# Patient Record
Sex: MALE | Race: WHITE | NOT HISPANIC OR LATINO | URBAN - METROPOLITAN AREA
[De-identification: names, ages, dates, MRNs, and addresses within clinical notes are randomized per-mention and may not be internally consistent; named-entity substitution may affect disease eponyms.]

---

## 2017-03-01 ENCOUNTER — EMERGENCY (EMERGENCY)
Facility: HOSPITAL | Age: 77
LOS: 1 days | End: 2017-03-01
Payer: MEDICARE

## 2017-03-01 PROCEDURE — 99283 EMERGENCY DEPT VISIT LOW MDM: CPT

## 2017-03-12 ENCOUNTER — APPOINTMENT (OUTPATIENT)
Dept: POPULATION HEALTH | Facility: CLINIC | Age: 77
End: 2017-03-12

## 2017-12-04 ENCOUNTER — APPOINTMENT (OUTPATIENT)
Dept: UROLOGY | Facility: CLINIC | Age: 77
End: 2017-12-04

## 2018-01-21 ENCOUNTER — APPOINTMENT (OUTPATIENT)
Dept: POPULATION HEALTH | Facility: CLINIC | Age: 78
End: 2018-01-21
Payer: COMMERCIAL

## 2018-01-21 PROCEDURE — 93000 ELECTROCARDIOGRAM COMPLETE: CPT

## 2018-01-21 PROCEDURE — 99212 OFFICE O/P EST SF 10 MIN: CPT | Mod: 25

## 2018-01-21 PROCEDURE — 92551 PURE TONE HEARING TEST AIR: CPT

## 2018-01-21 PROCEDURE — 36415 COLL VENOUS BLD VENIPUNCTURE: CPT

## 2019-02-10 ENCOUNTER — APPOINTMENT (OUTPATIENT)
Dept: POPULATION HEALTH | Facility: CLINIC | Age: 79
End: 2019-02-10
Payer: COMMERCIAL

## 2019-02-10 PROCEDURE — 36415 COLL VENOUS BLD VENIPUNCTURE: CPT

## 2019-02-10 PROCEDURE — 93000 ELECTROCARDIOGRAM COMPLETE: CPT

## 2019-02-10 PROCEDURE — 99212 OFFICE O/P EST SF 10 MIN: CPT | Mod: 25

## 2019-02-10 PROCEDURE — 94010 BREATHING CAPACITY TEST: CPT

## 2019-02-10 PROCEDURE — 92551 PURE TONE HEARING TEST AIR: CPT

## 2019-05-16 ENCOUNTER — OUTPATIENT (OUTPATIENT)
Dept: OUTPATIENT SERVICES | Facility: HOSPITAL | Age: 79
LOS: 1 days | End: 2019-05-16

## 2019-12-06 ENCOUNTER — APPOINTMENT (OUTPATIENT)
Dept: RADIOLOGY | Facility: CLINIC | Age: 79
End: 2019-12-06
Payer: MEDICARE

## 2019-12-06 PROCEDURE — 71046 X-RAY EXAM CHEST 2 VIEWS: CPT

## 2021-12-28 ENCOUNTER — OUTPATIENT (OUTPATIENT)
Dept: OUTPATIENT SERVICES | Facility: HOSPITAL | Age: 81
LOS: 1 days | End: 2021-12-28

## 2022-01-08 ENCOUNTER — APPOINTMENT (OUTPATIENT)
Dept: DISASTER EMERGENCY | Facility: CLINIC | Age: 82
End: 2022-01-08

## 2022-01-10 ENCOUNTER — FORM ENCOUNTER (OUTPATIENT)
Age: 82
End: 2022-01-10

## 2022-01-11 ENCOUNTER — OUTPATIENT (OUTPATIENT)
Dept: OUTPATIENT SERVICES | Facility: HOSPITAL | Age: 82
LOS: 1 days | End: 2022-01-11

## 2022-01-11 ENCOUNTER — INPATIENT (INPATIENT)
Facility: HOSPITAL | Age: 82
LOS: 2 days | Discharge: HOME HEALTH | End: 2022-01-14

## 2022-01-12 ENCOUNTER — OUTPATIENT (OUTPATIENT)
Dept: OUTPATIENT SERVICES | Facility: HOSPITAL | Age: 82
LOS: 1 days | End: 2022-01-12

## 2022-01-13 ENCOUNTER — OUTPATIENT (OUTPATIENT)
Dept: OUTPATIENT SERVICES | Facility: HOSPITAL | Age: 82
LOS: 1 days | End: 2022-01-13

## 2022-01-14 ENCOUNTER — OUTPATIENT (OUTPATIENT)
Dept: OUTPATIENT SERVICES | Facility: HOSPITAL | Age: 82
LOS: 1 days | End: 2022-01-14

## 2022-01-27 DIAGNOSIS — I25.10 ATHEROSCLEROTIC HEART DISEASE OF NATIVE CORONARY ARTERY WITHOUT ANGINA PECTORIS: ICD-10-CM

## 2022-01-27 DIAGNOSIS — M17.11 UNILATERAL PRIMARY OSTEOARTHRITIS, RIGHT KNEE: ICD-10-CM

## 2022-01-27 DIAGNOSIS — D62 ACUTE POSTHEMORRHAGIC ANEMIA: ICD-10-CM

## 2022-01-27 DIAGNOSIS — I48.0 PAROXYSMAL ATRIAL FIBRILLATION: ICD-10-CM

## 2022-01-27 DIAGNOSIS — Z95.5 PRESENCE OF CORONARY ANGIOPLASTY IMPLANT AND GRAFT: ICD-10-CM

## 2022-01-27 DIAGNOSIS — Z96.651 PRESENCE OF RIGHT ARTIFICIAL KNEE JOINT: ICD-10-CM

## 2022-01-27 DIAGNOSIS — I48.92 UNSPECIFIED ATRIAL FLUTTER: ICD-10-CM

## 2022-01-27 DIAGNOSIS — I10 ESSENTIAL (PRIMARY) HYPERTENSION: ICD-10-CM

## 2022-01-27 DIAGNOSIS — Z79.01 LONG TERM (CURRENT) USE OF ANTICOAGULANTS: ICD-10-CM

## 2022-01-27 DIAGNOSIS — Z95.1 PRESENCE OF AORTOCORONARY BYPASS GRAFT: ICD-10-CM

## 2022-01-27 DIAGNOSIS — Z85.46 PERSONAL HISTORY OF MALIGNANT NEOPLASM OF PROSTATE: ICD-10-CM

## 2022-01-27 DIAGNOSIS — K59.00 CONSTIPATION, UNSPECIFIED: ICD-10-CM

## 2022-01-27 DIAGNOSIS — I25.2 OLD MYOCARDIAL INFARCTION: ICD-10-CM

## 2022-01-27 DIAGNOSIS — J30.9 ALLERGIC RHINITIS, UNSPECIFIED: ICD-10-CM

## 2022-01-27 DIAGNOSIS — E78.5 HYPERLIPIDEMIA, UNSPECIFIED: ICD-10-CM

## 2022-01-31 ENCOUNTER — OUTPATIENT (OUTPATIENT)
Dept: OUTPATIENT SERVICES | Facility: HOSPITAL | Age: 82
LOS: 1 days | End: 2022-01-31

## 2022-01-31 DIAGNOSIS — Z96.651 PRESENCE OF RIGHT ARTIFICIAL KNEE JOINT: ICD-10-CM

## 2022-02-07 DIAGNOSIS — I25.10 ATHEROSCLEROTIC HEART DISEASE OF NATIVE CORONARY ARTERY WITHOUT ANGINA PECTORIS: ICD-10-CM

## 2022-02-07 DIAGNOSIS — Z96.651 PRESENCE OF RIGHT ARTIFICIAL KNEE JOINT: ICD-10-CM

## 2022-02-08 DIAGNOSIS — I48.0 PAROXYSMAL ATRIAL FIBRILLATION: ICD-10-CM

## 2022-02-08 DIAGNOSIS — Z96.651 PRESENCE OF RIGHT ARTIFICIAL KNEE JOINT: ICD-10-CM

## 2022-02-08 DIAGNOSIS — E78.5 HYPERLIPIDEMIA, UNSPECIFIED: ICD-10-CM

## 2022-02-08 DIAGNOSIS — D62 ACUTE POSTHEMORRHAGIC ANEMIA: ICD-10-CM

## 2022-02-08 DIAGNOSIS — I25.10 ATHEROSCLEROTIC HEART DISEASE OF NATIVE CORONARY ARTERY WITHOUT ANGINA PECTORIS: ICD-10-CM

## 2022-02-08 DIAGNOSIS — I10 ESSENTIAL (PRIMARY) HYPERTENSION: ICD-10-CM

## 2022-02-09 DIAGNOSIS — Z96.651 PRESENCE OF RIGHT ARTIFICIAL KNEE JOINT: ICD-10-CM

## 2022-02-09 DIAGNOSIS — I25.10 ATHEROSCLEROTIC HEART DISEASE OF NATIVE CORONARY ARTERY WITHOUT ANGINA PECTORIS: ICD-10-CM

## 2022-02-09 DIAGNOSIS — K59.00 CONSTIPATION, UNSPECIFIED: ICD-10-CM

## 2022-02-09 DIAGNOSIS — I10 ESSENTIAL (PRIMARY) HYPERTENSION: ICD-10-CM

## 2022-02-09 DIAGNOSIS — E78.5 HYPERLIPIDEMIA, UNSPECIFIED: ICD-10-CM

## 2022-02-09 DIAGNOSIS — I48.0 PAROXYSMAL ATRIAL FIBRILLATION: ICD-10-CM

## 2022-02-09 DIAGNOSIS — D62 ACUTE POSTHEMORRHAGIC ANEMIA: ICD-10-CM

## 2022-03-02 ENCOUNTER — APPOINTMENT (OUTPATIENT)
Dept: CT IMAGING | Facility: CLINIC | Age: 82
End: 2022-03-02
Payer: MEDICARE

## 2022-03-02 PROCEDURE — 71260 CT THORAX DX C+: CPT | Mod: MH

## 2022-03-02 PROCEDURE — 82565 ASSAY OF CREATININE: CPT | Mod: QW

## 2022-03-02 PROCEDURE — 74177 CT ABD & PELVIS W/CONTRAST: CPT | Mod: MH

## 2022-04-01 ENCOUNTER — APPOINTMENT (OUTPATIENT)
Dept: ULTRASOUND IMAGING | Facility: CLINIC | Age: 82
End: 2022-04-01
Payer: MEDICARE

## 2022-04-01 PROCEDURE — 76856 US EXAM PELVIC COMPLETE: CPT

## 2022-04-01 PROCEDURE — 76700 US EXAM ABDOM COMPLETE: CPT

## 2022-08-22 ENCOUNTER — NON-APPOINTMENT (OUTPATIENT)
Age: 82
End: 2022-08-22

## 2023-01-13 ENCOUNTER — NON-APPOINTMENT (OUTPATIENT)
Age: 83
End: 2023-01-13

## 2023-01-13 ENCOUNTER — APPOINTMENT (OUTPATIENT)
Dept: OPHTHALMOLOGY | Facility: CLINIC | Age: 83
End: 2023-01-13
Payer: MEDICARE

## 2023-01-13 PROCEDURE — 92134 CPTRZ OPH DX IMG PST SGM RTA: CPT

## 2023-01-13 PROCEDURE — 92004 COMPRE OPH EXAM NEW PT 1/>: CPT

## 2023-01-13 PROCEDURE — 92014 COMPRE OPH EXAM EST PT 1/>: CPT

## 2023-04-13 ENCOUNTER — NON-APPOINTMENT (OUTPATIENT)
Age: 83
End: 2023-04-13

## 2023-09-21 ASSESSMENT — KOOS JR
HOW SEVERE IS YOUR KNEE STIFFNESS AFTER FIRST WAKING IN MORNING: MILD
KOOS JR RAW SCORE: 6
IMPORTED FORM: YES
TWISING OR PIVOTING ON KNEE: MILD
GOING UP OR DOWN STAIRS: MODERATE
IMPORTED KOOS JR SCORE: 6.0
IMPORTED KOOS JR SCORE: 6.0
IMPORTED FORM: YES
KOOS JR RAW SCORE: 6
BENDING TO THE FLOOR TO PICK UP OBJECT: MODERATE
TWISING OR PIVOTING ON KNEE: MILD
HOW SEVERE IS YOUR KNEE STIFFNESS AFTER FIRST WAKING IN MORNING: MILD
BENDING TO THE FLOOR TO PICK UP OBJECT: MODERATE
GOING UP OR DOWN STAIRS: MODERATE

## 2024-01-08 ENCOUNTER — INPATIENT (INPATIENT)
Facility: HOSPITAL | Age: 84
LOS: 1 days | Discharge: HOME | DRG: 551 | End: 2024-01-10
Attending: STUDENT IN AN ORGANIZED HEALTH CARE EDUCATION/TRAINING PROGRAM | Admitting: STUDENT IN AN ORGANIZED HEALTH CARE EDUCATION/TRAINING PROGRAM
Payer: COMMERCIAL

## 2024-01-08 ENCOUNTER — APPOINTMENT (OUTPATIENT)
Dept: RADIOLOGY | Facility: CLINIC | Age: 84
End: 2024-01-08

## 2024-01-08 ENCOUNTER — APPOINTMENT (OUTPATIENT)
Dept: CT IMAGING | Facility: CLINIC | Age: 84
End: 2024-01-08
Payer: MEDICARE

## 2024-01-08 VITALS
HEIGHT: 71 IN | OXYGEN SATURATION: 98 % | HEART RATE: 69 BPM | SYSTOLIC BLOOD PRESSURE: 133 MMHG | DIASTOLIC BLOOD PRESSURE: 70 MMHG | WEIGHT: 162.92 LBS | RESPIRATION RATE: 18 BRPM | TEMPERATURE: 98 F

## 2024-01-08 LAB
ALBUMIN SERPL ELPH-MCNC: 4.2 G/DL — SIGNIFICANT CHANGE UP (ref 3.3–5.2)
ALBUMIN SERPL ELPH-MCNC: 4.2 G/DL — SIGNIFICANT CHANGE UP (ref 3.3–5.2)
ALP SERPL-CCNC: 54 U/L — SIGNIFICANT CHANGE UP (ref 40–120)
ALP SERPL-CCNC: 54 U/L — SIGNIFICANT CHANGE UP (ref 40–120)
ALT FLD-CCNC: 16 U/L — SIGNIFICANT CHANGE UP
ALT FLD-CCNC: 16 U/L — SIGNIFICANT CHANGE UP
ANION GAP SERPL CALC-SCNC: 14 MMOL/L — SIGNIFICANT CHANGE UP (ref 5–17)
ANION GAP SERPL CALC-SCNC: 14 MMOL/L — SIGNIFICANT CHANGE UP (ref 5–17)
APTT BLD: 49 SEC — HIGH (ref 24.5–35.6)
APTT BLD: 49 SEC — HIGH (ref 24.5–35.6)
AST SERPL-CCNC: 26 U/L — SIGNIFICANT CHANGE UP
AST SERPL-CCNC: 26 U/L — SIGNIFICANT CHANGE UP
BASOPHILS # BLD AUTO: 0.03 K/UL — SIGNIFICANT CHANGE UP (ref 0–0.2)
BASOPHILS # BLD AUTO: 0.03 K/UL — SIGNIFICANT CHANGE UP (ref 0–0.2)
BASOPHILS NFR BLD AUTO: 0.3 % — SIGNIFICANT CHANGE UP (ref 0–2)
BASOPHILS NFR BLD AUTO: 0.3 % — SIGNIFICANT CHANGE UP (ref 0–2)
BILIRUB SERPL-MCNC: 1 MG/DL — SIGNIFICANT CHANGE UP (ref 0.4–2)
BILIRUB SERPL-MCNC: 1 MG/DL — SIGNIFICANT CHANGE UP (ref 0.4–2)
BUN SERPL-MCNC: 23.4 MG/DL — HIGH (ref 8–20)
BUN SERPL-MCNC: 23.4 MG/DL — HIGH (ref 8–20)
CALCIUM SERPL-MCNC: 9.6 MG/DL — SIGNIFICANT CHANGE UP (ref 8.4–10.5)
CALCIUM SERPL-MCNC: 9.6 MG/DL — SIGNIFICANT CHANGE UP (ref 8.4–10.5)
CHLORIDE SERPL-SCNC: 101 MMOL/L — SIGNIFICANT CHANGE UP (ref 96–108)
CHLORIDE SERPL-SCNC: 101 MMOL/L — SIGNIFICANT CHANGE UP (ref 96–108)
CO2 SERPL-SCNC: 22 MMOL/L — SIGNIFICANT CHANGE UP (ref 22–29)
CO2 SERPL-SCNC: 22 MMOL/L — SIGNIFICANT CHANGE UP (ref 22–29)
CREAT SERPL-MCNC: 0.63 MG/DL — SIGNIFICANT CHANGE UP (ref 0.5–1.3)
CREAT SERPL-MCNC: 0.63 MG/DL — SIGNIFICANT CHANGE UP (ref 0.5–1.3)
EGFR: 93 ML/MIN/1.73M2 — SIGNIFICANT CHANGE UP
EGFR: 93 ML/MIN/1.73M2 — SIGNIFICANT CHANGE UP
EOSINOPHIL # BLD AUTO: 0.35 K/UL — SIGNIFICANT CHANGE UP (ref 0–0.5)
EOSINOPHIL # BLD AUTO: 0.35 K/UL — SIGNIFICANT CHANGE UP (ref 0–0.5)
EOSINOPHIL NFR BLD AUTO: 3.9 % — SIGNIFICANT CHANGE UP (ref 0–6)
EOSINOPHIL NFR BLD AUTO: 3.9 % — SIGNIFICANT CHANGE UP (ref 0–6)
GLUCOSE SERPL-MCNC: 89 MG/DL — SIGNIFICANT CHANGE UP (ref 70–99)
GLUCOSE SERPL-MCNC: 89 MG/DL — SIGNIFICANT CHANGE UP (ref 70–99)
HCT VFR BLD CALC: 41.1 % — SIGNIFICANT CHANGE UP (ref 39–50)
HCT VFR BLD CALC: 41.1 % — SIGNIFICANT CHANGE UP (ref 39–50)
HGB BLD-MCNC: 13.8 G/DL — SIGNIFICANT CHANGE UP (ref 13–17)
HGB BLD-MCNC: 13.8 G/DL — SIGNIFICANT CHANGE UP (ref 13–17)
IMM GRANULOCYTES NFR BLD AUTO: 0.4 % — SIGNIFICANT CHANGE UP (ref 0–0.9)
IMM GRANULOCYTES NFR BLD AUTO: 0.4 % — SIGNIFICANT CHANGE UP (ref 0–0.9)
INR BLD: 1.53 RATIO — HIGH (ref 0.85–1.18)
INR BLD: 1.53 RATIO — HIGH (ref 0.85–1.18)
LYMPHOCYTES # BLD AUTO: 1.41 K/UL — SIGNIFICANT CHANGE UP (ref 1–3.3)
LYMPHOCYTES # BLD AUTO: 1.41 K/UL — SIGNIFICANT CHANGE UP (ref 1–3.3)
LYMPHOCYTES # BLD AUTO: 15.7 % — SIGNIFICANT CHANGE UP (ref 13–44)
LYMPHOCYTES # BLD AUTO: 15.7 % — SIGNIFICANT CHANGE UP (ref 13–44)
MCHC RBC-ENTMCNC: 32.4 PG — SIGNIFICANT CHANGE UP (ref 27–34)
MCHC RBC-ENTMCNC: 32.4 PG — SIGNIFICANT CHANGE UP (ref 27–34)
MCHC RBC-ENTMCNC: 33.6 GM/DL — SIGNIFICANT CHANGE UP (ref 32–36)
MCHC RBC-ENTMCNC: 33.6 GM/DL — SIGNIFICANT CHANGE UP (ref 32–36)
MCV RBC AUTO: 96.5 FL — SIGNIFICANT CHANGE UP (ref 80–100)
MCV RBC AUTO: 96.5 FL — SIGNIFICANT CHANGE UP (ref 80–100)
MONOCYTES # BLD AUTO: 0.87 K/UL — SIGNIFICANT CHANGE UP (ref 0–0.9)
MONOCYTES # BLD AUTO: 0.87 K/UL — SIGNIFICANT CHANGE UP (ref 0–0.9)
MONOCYTES NFR BLD AUTO: 9.7 % — SIGNIFICANT CHANGE UP (ref 2–14)
MONOCYTES NFR BLD AUTO: 9.7 % — SIGNIFICANT CHANGE UP (ref 2–14)
NEUTROPHILS # BLD AUTO: 6.3 K/UL — SIGNIFICANT CHANGE UP (ref 1.8–7.4)
NEUTROPHILS # BLD AUTO: 6.3 K/UL — SIGNIFICANT CHANGE UP (ref 1.8–7.4)
NEUTROPHILS NFR BLD AUTO: 70 % — SIGNIFICANT CHANGE UP (ref 43–77)
NEUTROPHILS NFR BLD AUTO: 70 % — SIGNIFICANT CHANGE UP (ref 43–77)
PLATELET # BLD AUTO: 177 K/UL — SIGNIFICANT CHANGE UP (ref 150–400)
PLATELET # BLD AUTO: 177 K/UL — SIGNIFICANT CHANGE UP (ref 150–400)
POTASSIUM SERPL-MCNC: 4.5 MMOL/L — SIGNIFICANT CHANGE UP (ref 3.5–5.3)
POTASSIUM SERPL-MCNC: 4.5 MMOL/L — SIGNIFICANT CHANGE UP (ref 3.5–5.3)
POTASSIUM SERPL-SCNC: 4.5 MMOL/L — SIGNIFICANT CHANGE UP (ref 3.5–5.3)
POTASSIUM SERPL-SCNC: 4.5 MMOL/L — SIGNIFICANT CHANGE UP (ref 3.5–5.3)
PROT SERPL-MCNC: 6.8 G/DL — SIGNIFICANT CHANGE UP (ref 6.6–8.7)
PROT SERPL-MCNC: 6.8 G/DL — SIGNIFICANT CHANGE UP (ref 6.6–8.7)
PROTHROM AB SERPL-ACNC: 16.8 SEC — HIGH (ref 9.5–13)
PROTHROM AB SERPL-ACNC: 16.8 SEC — HIGH (ref 9.5–13)
RBC # BLD: 4.26 M/UL — SIGNIFICANT CHANGE UP (ref 4.2–5.8)
RBC # BLD: 4.26 M/UL — SIGNIFICANT CHANGE UP (ref 4.2–5.8)
RBC # FLD: 13.4 % — SIGNIFICANT CHANGE UP (ref 10.3–14.5)
RBC # FLD: 13.4 % — SIGNIFICANT CHANGE UP (ref 10.3–14.5)
SODIUM SERPL-SCNC: 137 MMOL/L — SIGNIFICANT CHANGE UP (ref 135–145)
SODIUM SERPL-SCNC: 137 MMOL/L — SIGNIFICANT CHANGE UP (ref 135–145)
WBC # BLD: 9 K/UL — SIGNIFICANT CHANGE UP (ref 3.8–10.5)
WBC # BLD: 9 K/UL — SIGNIFICANT CHANGE UP (ref 3.8–10.5)
WBC # FLD AUTO: 9 K/UL — SIGNIFICANT CHANGE UP (ref 3.8–10.5)
WBC # FLD AUTO: 9 K/UL — SIGNIFICANT CHANGE UP (ref 3.8–10.5)

## 2024-01-08 PROCEDURE — 99285 EMERGENCY DEPT VISIT HI MDM: CPT

## 2024-01-08 PROCEDURE — 72128 CT CHEST SPINE W/O DYE: CPT | Mod: 26,MG

## 2024-01-08 PROCEDURE — 72131 CT LUMBAR SPINE W/O DYE: CPT | Mod: 26,ME

## 2024-01-08 PROCEDURE — G1004: CPT

## 2024-01-08 PROCEDURE — 71046 X-RAY EXAM CHEST 2 VIEWS: CPT

## 2024-01-08 PROCEDURE — 70450 CT HEAD/BRAIN W/O DYE: CPT | Mod: 26,MF

## 2024-01-08 PROCEDURE — 70450 CT HEAD/BRAIN W/O DYE: CPT | Mod: MH,77

## 2024-01-08 NOTE — ED PROVIDER NOTE - CARE PLAN
1 Principal Discharge DX:	Subarachnoid hemorrhage   Principal Discharge DX:	Subarachnoid hemorrhage  Secondary Diagnosis:	Fracture of lumbar vertebra, compression

## 2024-01-08 NOTE — ED PROVIDER NOTE - CLINICAL SUMMARY MEDICAL DECISION MAKING FREE TEXT BOX
pt is well appearing and neuro intact with small SAH and fall from several days ago. NSx consulted for recommendations pt is well appearing and neuro intact with small SAH and fall from several days ago. NSx consulted for recommendations    Spoke with neurosurgery PA advises hold Eliquis no need for repeat CAT scan will request brace for L3 fracture.  spoke personally with family patient unsteady on feet for multiple days unsafe dispo home ambulating with distress at home multiple falls with acute fall subarachnoid hemorrhage acute fall L3 fracture consulted trauma awaiting their recommendations updated daughter and patient's wife at bedside awaiting trauma formal plan

## 2024-01-08 NOTE — CONSULT NOTE ADULT - SUBJECTIVE AND OBJECTIVE BOX
Patient is a 83y old  Male who presents with a chief complaint of   HPI: 84 y/o M with pmhx of HTN, HLD, afib on Eliquis, cardiac stents (2018), prostate surgery, right knee replacement, hernia surgery, transferred from [facility] [place] for SAH s/p fall. Pt wife at bedside, noting that he has had "dizzy spells" and falls for the last 2-3 months causing him to fall. States that last Thursday, pt hit his head on the dresser with + HS/-LOC after feeling dizzy from getting up. Pt denies HA, SOB, CP, or abd pain at the time, but states he hurt his left lower back during the fall, which continues to intermittently hurt him now. Notes they called the cardiologist whom they were able to see today. Cardiologist ordered an outpatient CTH which demonstrated L frontal SAH and was sent to [facility] [place], eliquis was not reversed, findings confirmed, and then sent here. Of note, wife states patient shuffles his feet ever since his knee replacement. Denies current neurological deficits but does not left lower back pain.       PAST MEDICAL & SURGICAL HISTORY:    FAMILY HISTORY:      SOCIAL HISTORY:  Tobacco Use:  EtOH use:   Substance:    Allergies    No Known Allergies    Intolerances        REVIEW OF SYSTEMS  Negative except as noted in HPI      HOME MEDICATIONS:  Home Medications:      MEDICATIONS:  Antibiotics:    Neuro:    Anticoagulation:    OTHER:    IVF:      Vital Signs Last 24 Hrs  T(C): 36.7 (08 Jan 2024 17:52), Max: 36.7 (08 Jan 2024 17:52)  T(F): 98 (08 Jan 2024 17:52), Max: 98 (08 Jan 2024 17:52)  HR: 69 (08 Jan 2024 17:52) (69 - 69)  BP: 133/70 (08 Jan 2024 17:52) (133/70 - 133/70)  BP(mean): --  RR: 18 (08 Jan 2024 17:52) (18 - 18)  SpO2: 98% (08 Jan 2024 17:52) (98% - 98%)    Parameters below as of 08 Jan 2024 17:52  Patient On (Oxygen Delivery Method): room air          PHYSICAL EXAM:  GENERAL: NAD, well-groomed  HEAD:  Atraumatic, normocephalic  EYES: Conjunctiva and sclera clear; corneal reflex intact   PRADEEP COMA SCORE: E- V- M- =15  CRANIAL NERVES: PERRL. EOMI without nystagmus. Facial sensation intact V1-3 distribution b/l. Face symmetric w/ normal eye closure and smile, tongue midline. Hearing grossly intact. Speech clear. Head turning and shoulder shrug intact.   MOTOR: strength 5/5 b/l upper and lower extremities  SENSATION: grossly intact to light touch all extremities  CHEST/LUNG: Chest rise and fall equally and bilaterally   HEART: +S1/+S2   ABDOMEN: Soft, nontender, nondistended   EXTREMITIES:   no clubbing, cyanosis, or edema  SKIN: Warm, dry; no rashes or lesions    LABS:        CULTURES:      RADIOLOGY & ADDITIONAL STUDIES:    ASSESSMENT AND PLAN:     Assessment: 84 y/o male on ASA/Eliquis for afib/cardiac stents s/p +HS/-LOC to dresser after falling from getting up. CTH demonstrates L frontal SAH.     Plan:   - Trauma eval   - 6 hour CTH stability scan   - CT T/L spine for back pain   - BP <160   - hold AC/AP   - Syncope and dizziness work up   - scans for back    Patient is a 83y old  Male who presents with a chief complaint of   HPI: 82 y/o M with pmhx of HTN, HLD, afib on Eliquis, cardiac stents (2018), prostate surgery, right knee replacement, hernia surgery, transferred from [facility] [place] for SAH s/p fall. Pt wife at bedside, noting that he has had "dizzy spells" and falls for the last 2-3 months causing him to fall. States that last Thursday, pt hit his head on the dresser with + HS/-LOC after feeling dizzy from getting up. Pt denies HA, SOB, CP, or abd pain at the time, but states he hurt his left lower back during the fall, which continues to intermittently hurt him now. Notes they called the cardiologist whom they were able to see today. Cardiologist ordered an outpatient CTH which demonstrated L frontal SAH and was sent to [facility] [place], eliquis was not reversed, findings confirmed, and then sent here. Of note, wife states patient shuffles his feet ever since his knee replacement. Denies current neurological deficits but does not left lower back pain.       PAST MEDICAL & SURGICAL HISTORY:    FAMILY HISTORY:      SOCIAL HISTORY:  Tobacco Use:  EtOH use:   Substance:    Allergies    No Known Allergies    Intolerances        REVIEW OF SYSTEMS  Negative except as noted in HPI      HOME MEDICATIONS:  Home Medications:      MEDICATIONS:  Antibiotics:    Neuro:    Anticoagulation:    OTHER:    IVF:      Vital Signs Last 24 Hrs  T(C): 36.7 (08 Jan 2024 17:52), Max: 36.7 (08 Jan 2024 17:52)  T(F): 98 (08 Jan 2024 17:52), Max: 98 (08 Jan 2024 17:52)  HR: 69 (08 Jan 2024 17:52) (69 - 69)  BP: 133/70 (08 Jan 2024 17:52) (133/70 - 133/70)  BP(mean): --  RR: 18 (08 Jan 2024 17:52) (18 - 18)  SpO2: 98% (08 Jan 2024 17:52) (98% - 98%)    Parameters below as of 08 Jan 2024 17:52  Patient On (Oxygen Delivery Method): room air          PHYSICAL EXAM:  GENERAL: NAD, well-groomed  HEAD:  Atraumatic, normocephalic  EYES: Conjunctiva and sclera clear; corneal reflex intact   PRADEEP COMA SCORE: E- V- M- =15  CRANIAL NERVES: PERRL. EOMI without nystagmus. Facial sensation intact V1-3 distribution b/l. Face symmetric w/ normal eye closure and smile, tongue midline. Hearing grossly intact. Speech clear. Head turning and shoulder shrug intact.   MOTOR: strength 5/5 b/l upper and lower extremities  SENSATION: grossly intact to light touch all extremities  CHEST/LUNG: Chest rise and fall equally and bilaterally   HEART: +S1/+S2   ABDOMEN: Soft, nontender, nondistended   EXTREMITIES:   no clubbing, cyanosis, or edema  SKIN: Warm, dry; no rashes or lesions    LABS:        CULTURES:      RADIOLOGY & ADDITIONAL STUDIES:    ASSESSMENT AND PLAN:     Assessment: 82 y/o male on ASA/Eliquis for afib/cardiac stents s/p +HS/-LOC to dresser after falling from getting up. CTH demonstrates L frontal SAH.     Plan:   - Trauma eval   - 6 hour CTH stability scan   - CT T/L spine for back pain   - BP <160   - hold AC/AP   - Syncope and dizziness work up   - scans for back    Patient is a 83y old  Male who presents with a chief complaint of   HPI: 82 y/o M with pmhx of HTN, HLD, afib on Eliquis, cardiac stents (2018), prostate surgery, right knee replacement, hernia surgery, transferred from [facility] [place] for SAH s/p fall. Pt wife at bedside, noting that he has had "dizzy spells" and falls for the last 2-3 months. States that last Thursday, pt hit his head on the dresser with + HS/-LOC after feeling dizzy from getting up. Pt denies HA, SOB, CP, or abd pain at the time, but states he hurt his left lower back during the fall, which continues to intermittently hurt him now. Notes they called the cardiologist whom they were able to see today. Cardiologist ordered an outpatient CTH which demonstrated L frontal SAH and was sent to [facility] [place], eliquis was not reversed, findings confirmed, and then sent here. Of note, wife states patient shuffles his feet ever since his knee replacement. Denies current neurological deficits but does not left lower back pain.       PAST MEDICAL & SURGICAL HISTORY:    FAMILY HISTORY:      SOCIAL HISTORY:  Tobacco Use:  EtOH use:   Substance:    Allergies    No Known Allergies    Intolerances        REVIEW OF SYSTEMS  Negative except as noted in HPI      HOME MEDICATIONS:  Home Medications:      MEDICATIONS:  Antibiotics:    Neuro:    Anticoagulation:    OTHER:    IVF:      Vital Signs Last 24 Hrs  T(C): 36.7 (08 Jan 2024 17:52), Max: 36.7 (08 Jan 2024 17:52)  T(F): 98 (08 Jan 2024 17:52), Max: 98 (08 Jan 2024 17:52)  HR: 69 (08 Jan 2024 17:52) (69 - 69)  BP: 133/70 (08 Jan 2024 17:52) (133/70 - 133/70)  BP(mean): --  RR: 18 (08 Jan 2024 17:52) (18 - 18)  SpO2: 98% (08 Jan 2024 17:52) (98% - 98%)    Parameters below as of 08 Jan 2024 17:52  Patient On (Oxygen Delivery Method): room air          PHYSICAL EXAM:  GENERAL: NAD, well-groomed  HEAD:  Atraumatic, normocephalic  EYES: Conjunctiva and sclera clear; corneal reflex intact   PRADEEP COMA SCORE: E- V- M- =15  CRANIAL NERVES: PERRL. EOMI without nystagmus. Facial sensation intact V1-3 distribution b/l. Face symmetric w/ normal eye closure and smile, tongue midline. Hearing grossly intact. Speech clear. Head turning and shoulder shrug intact.   MOTOR: strength 5/5 b/l upper and lower extremities  SENSATION: grossly intact to light touch all extremities  CHEST/LUNG: Chest rise and fall equally and bilaterally   HEART: +S1/+S2   ABDOMEN: Soft, nontender, nondistended   EXTREMITIES:   no clubbing, cyanosis, or edema  SKIN: Warm, dry; no rashes or lesions    LABS:        CULTURES:      RADIOLOGY & ADDITIONAL STUDIES:    ASSESSMENT AND PLAN:     Assessment: 82 y/o male on ASA/Eliquis for afib/cardiac stents s/p +HS/-LOC to dresser after falling from getting up. CTH demonstrates L frontal SAH.     Plan:   - Trauma eval   - 6 hour CTH stability scan   - CT T/L spine for back pain   - BP <160   - hold AC/AP   - Syncope and dizziness work up   - scans for back    Patient is a 83y old  Male who presents with a chief complaint of   HPI: 82 y/o M with pmhx of HTN, HLD, afib on Eliquis, cardiac stents (2018), prostate surgery, right knee replacement, hernia surgery, transferred from [place] for SAH s/p fall. Pt wife at bedside, noting that he has had "dizzy spells" and falls for the last 2-3 months. States that last Thursday, pt hit his head on the dresser with + HS/-LOC after feeling dizzy from getting up. Pt denies HA, SOB, CP, or abd pain at the time, but states he hurt his left lower back during the fall, which continues to intermittently hurt him now. Notes they called the cardiologist whom they were able to see today. Cardiologist ordered an outpatient CTH which demonstrated L frontal SAH and was sent to [place], eliquis was not reversed, findings confirmed, and then sent here. Of note, wife states patient shuffles his feet ever since his knee replacement. Denies current neurological deficits but does not left lower back pain.       PAST MEDICAL & SURGICAL HISTORY:    FAMILY HISTORY:      SOCIAL HISTORY:  Tobacco Use:  EtOH use:   Substance:    Allergies    No Known Allergies    Intolerances        REVIEW OF SYSTEMS  Negative except as noted in HPI      HOME MEDICATIONS:  Home Medications:      MEDICATIONS:  Antibiotics:    Neuro:    Anticoagulation:    OTHER:    IVF:      Vital Signs Last 24 Hrs  T(C): 36.7 (08 Jan 2024 17:52), Max: 36.7 (08 Jan 2024 17:52)  T(F): 98 (08 Jan 2024 17:52), Max: 98 (08 Jan 2024 17:52)  HR: 69 (08 Jan 2024 17:52) (69 - 69)  BP: 133/70 (08 Jan 2024 17:52) (133/70 - 133/70)  BP(mean): --  RR: 18 (08 Jan 2024 17:52) (18 - 18)  SpO2: 98% (08 Jan 2024 17:52) (98% - 98%)    Parameters below as of 08 Jan 2024 17:52  Patient On (Oxygen Delivery Method): room air          PHYSICAL EXAM:  GENERAL: NAD, well-groomed  HEAD:  Atraumatic, normocephalic  EYES: Conjunctiva and sclera clear; corneal reflex intact   PRADEEP COMA SCORE: E- V- M- =15  CRANIAL NERVES: PERRL. EOMI without nystagmus. Facial sensation intact V1-3 distribution b/l. Face symmetric w/ normal eye closure and smile, tongue midline. Hearing grossly intact. Speech clear. Head turning and shoulder shrug intact.   MOTOR: strength 5/5 b/l upper and lower extremities  SENSATION: grossly intact to light touch all extremities  CHEST/LUNG: Chest rise and fall equally and bilaterally   HEART: +S1/+S2   ABDOMEN: Soft, nontender, nondistended   EXTREMITIES:   no clubbing, cyanosis, or edema  SKIN: Warm, dry; no rashes or lesions    LABS:        CULTURES:      RADIOLOGY & ADDITIONAL STUDIES:    ASSESSMENT AND PLAN:     Assessment: 82 y/o male on ASA/Eliquis for afib/cardiac stents s/p +HS/-LOC to dresser after falling from getting up. CTH demonstrates L frontal SAH.     Plan:   - Trauma eval   - 6 hour CTH stability scan   - CT T/L spine for back pain   - BP <160   - hold AC/AP   - Syncope and dizziness work up   - scans for back

## 2024-01-08 NOTE — ED ADULT TRIAGE NOTE - CHIEF COMPLAINT QUOTE
Speaking Coherently transfer [place] [facility] [place] for [place] on eliquis positive SAH alert and oriented x4, no complaints. transfer from [facility] [place] for [place] on eliquis positive SAH alert and oriented x4, no complaints.

## 2024-01-08 NOTE — ED ADULT NURSE NOTE - SUICIDE SCREENING QUESTION 3
c/o congestion since weekend clartin given today x4 nosebleeds denies fever feeding and drinking well No

## 2024-01-08 NOTE — ED ADULT NURSE REASSESSMENT NOTE - NS ED NURSE REASSESS COMMENT FT1
plan of care assumed from Niesha ENGEL. no S&S of acute distress noted. [place] [facility] [place]. pt states he has been experiencing "dizzy spells" and lightheadedness for a few days. denies headahce, vision changes. cp, sob, tinging/numbness. plan of care reviewed with pt and family; awaiting CT scan. plan of care assumed from Niesha ENGEL. no S&S of acute distress noted. pt is a transfer from [facility] [place] s/p fall x 4 days ago. pt states he has been experiencing "dizzy spells" and lightheadedness for a few days. denies headahce, vision changes. cp, sob, tinging/numbness. plan of care reviewed with pt and family; awaiting CT scan.

## 2024-01-08 NOTE — ED ADULT NURSE REASSESSMENT NOTE - NS ED NURSE REASSESS COMMENT FT1
pt received CT and results came back. awaiting neurosx reassessment. plan of care ongoing. pt and family updated

## 2024-01-08 NOTE — ED ADULT NURSE NOTE - OBJECTIVE STATEMENT
Pt transferred from [facility] for eval by neuro surgery s/p a trip and fall on 1/4, hit back of head, takes Eliquis qd, (last dose this am) head ct SAH, received k centra prior to transfer. Pt A+Ox3, gross neuro intact, gcs 15, Pt transferred from [facility] [place] by neuro surgery s/p a trip and fall on 1/4, hit back of head, takes Eliquis qd, (last dose this am) head ct SAH, received k centra prior to transfer. Pt A+Ox3, gross neuro intact, gcs 15,

## 2024-01-08 NOTE — ED PROVIDER NOTE - OBJECTIVE STATEMENT
83 year old male with PMH afib on eliquis presents as a [place] [facility] [place]. Pt reports that he had a trip and fall on 1/4, hit head, no LOC. He reports a mild headache since then, and was sent for outpt CTH this am, which revealed a small SAH. He went to [facility] and was subsequently transferred here. he denies blurry vision, numbness, tingling, weakness. 83 year old male with PMH afib on eliquis presents as a transfer from [facility] [place] for SAH. Pt reports that he had a trip and fall on 1/4, hit head, no LOC. He reports a mild headache since then, and was sent for outpt CTH this am, which revealed a small SAH. He went to [facility] and was subsequently transferred here. he denies blurry vision, numbness, tingling, weakness.

## 2024-01-08 NOTE — ED ADULT NURSE NOTE - NSFALLHARMRISKINTERV_ED_ALL_ED
Assistance OOB with selected safe patient handling equipment if applicable/Assistance with ambulation/Communicate risk of Fall with Harm to all staff, patient, and family/Monitor gait and stability/Monitor for mental status changes and reorient to person, place, and time, as needed/Move patient closer to nursing station/within visual sight of ED staff/Provide visual cue: red socks, yellow wristband, yellow gown, etc/Reinforce activity limits and safety measures with patient and family/Toileting schedule using arm’s reach rule for commode and bathroom/Use of alarms - bed, stretcher, chair and/or video monitoring/Bed in lowest position, wheels locked, appropriate side rails in place/Call bell, personal items and telephone in reach/Instruct patient to call for assistance before getting out of bed/chair/stretcher/Non-slip footwear applied when patient is off stretcher/[place] to call system/Physically safe environment - no spills, clutter or unnecessary equipment/Purposeful Proactive Rounding/Room/bathroom lighting operational, light cord in reach Assistance OOB with selected safe patient handling equipment if applicable/Assistance with ambulation/Communicate risk of Fall with Harm to all staff, patient, and family/Monitor gait and stability/Monitor for mental status changes and reorient to person, place, and time, as needed/Move patient closer to nursing station/within visual sight of ED staff/Provide visual cue: red socks, yellow wristband, yellow gown, etc/Reinforce activity limits and safety measures with patient and family/Toileting schedule using arm’s reach rule for commode and bathroom/Use of alarms - bed, stretcher, chair and/or video monitoring/Bed in lowest position, wheels locked, appropriate side rails in place/Call bell, personal items and telephone in reach/Instruct patient to call for assistance before getting out of bed/chair/stretcher/Non-slip footwear applied when patient is off stretcher/[place] to call system/Physically safe environment - no spills, clutter or unnecessary equipment/Purposeful Proactive Rounding/Room/bathroom lighting operational, light cord in reach Assistance OOB with selected safe patient handling equipment if applicable/Assistance with ambulation/Communicate risk of Fall with Harm to all staff, patient, and family/Monitor gait and stability/Monitor for mental status changes and reorient to person, place, and time, as needed/Move patient closer to nursing station/within visual sight of ED staff/Provide patient with walking aids/Provide visual cue: red socks, yellow wristband, yellow gown, etc/Reinforce activity limits and safety measures with patient and family/Toileting schedule using arm’s reach rule for commode and bathroom/Use of alarms - bed, stretcher, chair and/or video monitoring/Bed in lowest position, wheels locked, appropriate side rails in place/Call bell, personal items and telephone in reach/Instruct patient to call for assistance before getting out of bed/chair/stretcher/Non-slip footwear applied when patient is off stretcher/[place] to call system/Physically safe environment - no spills, clutter or unnecessary equipment/Purposeful Proactive Rounding/Room/bathroom lighting operational, light cord in reach Assistance OOB with selected safe patient handling equipment if applicable/Assistance with ambulation/Communicate risk of Fall with Harm to all staff, patient, and family/Monitor gait and stability/Monitor for mental status changes and reorient to person, place, and time, as needed/Move patient closer to nursing station/within visual sight of ED staff/Provide patient with walking aids/Provide visual cue: red socks, yellow wristband, yellow gown, etc/Reinforce activity limits and safety measures with patient and family/Toileting schedule using arm’s reach rule for commode and bathroom/Use of alarms - bed, stretcher, chair and/or video monitoring/Bed in lowest position, wheels locked, appropriate side rails in place/Call bell, personal items and telephone in reach/Instruct patient to call for assistance before getting out of bed/chair/stretcher/Non-slip footwear applied when patient is off stretcher/[place] to call system/Physically safe environment - no spills, clutter or unnecessary equipment/Purposeful Proactive Rounding/Room/bathroom lighting operational, light cord in reach

## 2024-01-08 NOTE — ED ADULT NURSE REASSESSMENT NOTE - NSFALLHARMRISKINTERV_ED_ALL_ED
Assistance OOB with selected safe patient handling equipment if applicable/Assistance with ambulation/Communicate risk of Fall with Harm to all staff, patient, and family/Encourage patient to sit up slowly, dangle for a short time, stand at bedside before walking/Monitor gait and stability/Orthostatic vital signs/Provide patient with walking aids/Provide visual cue: red socks, yellow wristband, yellow gown, etc/Reinforce activity limits and safety measures with patient and family/Bed in lowest position, wheels locked, appropriate side rails in place/Call bell, personal items and telephone in reach/Instruct patient to call for assistance before getting out of bed/chair/stretcher/Non-slip footwear applied when patient is off stretcher/[place] to call system/Physically safe environment - no spills, clutter or unnecessary equipment/Purposeful Proactive Rounding/Room/bathroom lighting operational, light cord in reach Assistance OOB with selected safe patient handling equipment if applicable/Assistance with ambulation/Communicate risk of Fall with Harm to all staff, patient, and family/Encourage patient to sit up slowly, dangle for a short time, stand at bedside before walking/Monitor gait and stability/Orthostatic vital signs/Provide patient with walking aids/Provide visual cue: red socks, yellow wristband, yellow gown, etc/Reinforce activity limits and safety measures with patient and family/Bed in lowest position, wheels locked, appropriate side rails in place/Call bell, personal items and telephone in reach/Instruct patient to call for assistance before getting out of bed/chair/stretcher/Non-slip footwear applied when patient is off stretcher/[place] to call system/Physically safe environment - no spills, clutter or unnecessary equipment/Purposeful Proactive Rounding/Room/bathroom lighting operational, light cord in reach

## 2024-01-08 NOTE — ED PROVIDER NOTE - PROGRESS NOTE DETAILS
Spoke with neurosurgery PA advises hold Eliquis no need for repeat CAT scan will request brace for L3 fracture.  spoke personally with family patient unsteady on feet for multiple days unsafe dispo home ambulating with distress at home multiple falls with acute fall subarachnoid hemorrhage acute fall L3 fracture consulted trauma awaiting their recommendations updated daughter and patient's wife at bedside awaiting trauma formal plan

## 2024-01-08 NOTE — ED ADULT NURSE NOTE - CHIEF COMPLAINT QUOTE
transfer from [facility] [place] for fall on eliquis positive SAH alert and oriented x4, no complaints. transfer from [facility] for fall on eliquis positive SAH alert and oriented x4, no complaints.

## 2024-01-09 DIAGNOSIS — I60.9 NONTRAUMATIC SUBARACHNOID HEMORRHAGE, UNSPECIFIED: ICD-10-CM

## 2024-01-09 PROCEDURE — 70450 CT HEAD/BRAIN W/O DYE: CPT | Mod: 26

## 2024-01-09 PROCEDURE — 93306 TTE W/DOPPLER COMPLETE: CPT | Mod: 26

## 2024-01-09 PROCEDURE — 93880 EXTRACRANIAL BILAT STUDY: CPT | Mod: 26

## 2024-01-09 PROCEDURE — 93010 ELECTROCARDIOGRAM REPORT: CPT

## 2024-01-09 PROCEDURE — 99232 SBSQ HOSP IP/OBS MODERATE 35: CPT

## 2024-01-09 PROCEDURE — 99223 1ST HOSP IP/OBS HIGH 75: CPT

## 2024-01-09 PROCEDURE — 99221 1ST HOSP IP/OBS SF/LOW 40: CPT

## 2024-01-09 RX ORDER — LISINOPRIL 2.5 MG/1
40 TABLET ORAL DAILY
Refills: 0 | Status: DISCONTINUED | OUTPATIENT
Start: 2024-01-09 | End: 2024-01-10

## 2024-01-09 RX ORDER — METHOCARBAMOL 500 MG/1
750 TABLET, FILM COATED ORAL THREE TIMES A DAY
Refills: 0 | Status: DISCONTINUED | OUTPATIENT
Start: 2024-01-09 | End: 2024-01-10

## 2024-01-09 RX ORDER — OXYCODONE HYDROCHLORIDE 5 MG/1
2.5 TABLET ORAL EVERY 4 HOURS
Refills: 0 | Status: DISCONTINUED | OUTPATIENT
Start: 2024-01-09 | End: 2024-01-10

## 2024-01-09 RX ORDER — ACETAMINOPHEN 500 MG
975 TABLET ORAL EVERY 6 HOURS
Refills: 0 | Status: DISCONTINUED | OUTPATIENT
Start: 2024-01-09 | End: 2024-01-10

## 2024-01-09 RX ORDER — SENNA PLUS 8.6 MG/1
2 TABLET ORAL AT BEDTIME
Refills: 0 | Status: DISCONTINUED | OUTPATIENT
Start: 2024-01-09 | End: 2024-01-10

## 2024-01-09 RX ORDER — ATORVASTATIN CALCIUM 80 MG/1
40 TABLET, FILM COATED ORAL AT BEDTIME
Refills: 0 | Status: DISCONTINUED | OUTPATIENT
Start: 2024-01-09 | End: 2024-01-10

## 2024-01-09 RX ORDER — POLYETHYLENE GLYCOL 3350 17 G/17G
17 POWDER, FOR SOLUTION ORAL DAILY
Refills: 0 | Status: DISCONTINUED | OUTPATIENT
Start: 2024-01-09 | End: 2024-01-10

## 2024-01-09 RX ORDER — LIDOCAINE 4 G/100G
2 CREAM TOPICAL EVERY 24 HOURS
Refills: 0 | Status: DISCONTINUED | OUTPATIENT
Start: 2024-01-09 | End: 2024-01-10

## 2024-01-09 RX ORDER — ESCITALOPRAM OXALATE 10 MG/1
10 TABLET, FILM COATED ORAL DAILY
Refills: 0 | Status: DISCONTINUED | OUTPATIENT
Start: 2024-01-09 | End: 2024-01-10

## 2024-01-09 RX ORDER — OXYCODONE HYDROCHLORIDE 5 MG/1
5 TABLET ORAL EVERY 4 HOURS
Refills: 0 | Status: DISCONTINUED | OUTPATIENT
Start: 2024-01-09 | End: 2024-01-10

## 2024-01-09 RX ORDER — MAGNESIUM HYDROXIDE 400 MG/1
30 TABLET, CHEWABLE ORAL DAILY
Refills: 0 | Status: DISCONTINUED | OUTPATIENT
Start: 2024-01-09 | End: 2024-01-10

## 2024-01-09 RX ORDER — HYDROMORPHONE HYDROCHLORIDE 2 MG/ML
0.2 INJECTION INTRAMUSCULAR; INTRAVENOUS; SUBCUTANEOUS EVERY 4 HOURS
Refills: 0 | Status: DISCONTINUED | OUTPATIENT
Start: 2024-01-09 | End: 2024-01-10

## 2024-01-09 RX ADMIN — LISINOPRIL 40 MILLIGRAM(S): 2.5 TABLET ORAL at 05:58

## 2024-01-09 RX ADMIN — ATORVASTATIN CALCIUM 40 MILLIGRAM(S): 80 TABLET, FILM COATED ORAL at 23:44

## 2024-01-09 RX ADMIN — Medication 975 MILLIGRAM(S): at 12:35

## 2024-01-09 RX ADMIN — Medication 975 MILLIGRAM(S): at 23:45

## 2024-01-09 RX ADMIN — METHOCARBAMOL 750 MILLIGRAM(S): 500 TABLET, FILM COATED ORAL at 14:29

## 2024-01-09 RX ADMIN — METHOCARBAMOL 750 MILLIGRAM(S): 500 TABLET, FILM COATED ORAL at 23:44

## 2024-01-09 RX ADMIN — Medication 975 MILLIGRAM(S): at 05:59

## 2024-01-09 RX ADMIN — Medication 975 MILLIGRAM(S): at 06:05

## 2024-01-09 RX ADMIN — SENNA PLUS 2 TABLET(S): 8.6 TABLET ORAL at 23:45

## 2024-01-09 RX ADMIN — ESCITALOPRAM OXALATE 10 MILLIGRAM(S): 10 TABLET, FILM COATED ORAL at 12:35

## 2024-01-09 RX ADMIN — Medication 975 MILLIGRAM(S): at 17:28

## 2024-01-09 RX ADMIN — METHOCARBAMOL 750 MILLIGRAM(S): 500 TABLET, FILM COATED ORAL at 05:59

## 2024-01-09 RX ADMIN — LIDOCAINE 2 PATCH: 4 CREAM TOPICAL at 05:59

## 2024-01-09 NOTE — PHYSICAL THERAPY INITIAL EVALUATION ADULT - PERTINENT HX OF CURRENT PROBLEM, REHAB EVAL
84yo M presenting s/p fall with SAH and L3 compression fx 82yo M presenting s/p fall with SAH and L3 compression fx

## 2024-01-09 NOTE — PROGRESS NOTE ADULT - SUBJECTIVE AND OBJECTIVE BOX
HPI:  84 y/o M with pmhx of HTN, HLD, afib on Eliquis, cardiac stents (2018), prostate surgery, right knee replacement, hernia surgery, transferred from [facility] [place] for SAH s/p fall. Pt wife at bedside, noting that he has had "dizzy spells" and falls for the last 2-3 months. States that last Thursday, pt hit his head on the dresser with + HS/-LOC after feeling dizzy from getting up. Pt denies HA, SOB, CP, or abd pain at the time, but states he hurt his left lower back during the fall, which continues to intermittently hurt him now. Notes they called the cardiologist whom they were able to see today. Cardiologist ordered an outpatient CTH which demonstrated L frontal SAH and was sent to [facility] [place], eliquis was not reversed, findings confirmed, and then sent here. Of note, wife states patient shuffles his feet ever since his knee replacement. Denies current neurological deficits but does not left lower back pain.     INTERVAL HPI/OVERNIGHT EVENTS:  83-year-old male seen lying comfortably in bed this morning on rounds. Denies headache, weakness, numbness, n/v/d, fevers, chills, chest pain, SOB.     Vital Signs Last 24 Hrs  T(C): 37.2 (09 Jan 2024 15:53), Max: 37.2 (09 Jan 2024 15:53)  T(F): 98.9 (09 Jan 2024 15:53), Max: 98.9 (09 Jan 2024 15:53)  HR: 58 (09 Jan 2024 15:53) (53 - 69)  BP: 116/67 (09 Jan 2024 15:53) (113/66 - 134/94)  RR: 17 (09 Jan 2024 15:53) (16 - 20)  SpO2: 96% (09 Jan 2024 15:53) (96% - 100%)  Parameters below as of 09 Jan 2024 15:53  Patient On (Oxygen Delivery Method): room air    PHYSICAL EXAM:  GENERAL: NAD, well-groomed  HEAD:  Atraumatic, normocephalic  EYES: Conjunctiva and sclera clear; corneal reflex intact   PRADEEP COMA SCORE: E- V- M- =15  CRANIAL NERVES: PERRL. EOMI without nystagmus. Facial sensation intact V1-3 distribution b/l. Face symmetric w/ normal eye closure and smile, tongue midline. Hearing grossly intact. Speech clear. Head turning and shoulder shrug intact.   MOTOR: Strength 5/5 b/l upper and lower extremities  SENSATION: Grossly intact to light touch all extremities  CHEST/LUNG: Chest rise and fall equally and bilaterally   ABDOMEN: Soft, nontender, nondistended   EXTREMITIES: No clubbing, cyanosis, or edema  SKIN: Warm, dry; no rashes or lesions    LABS:                        13.8   9.00  )-----------( 177      ( 08 Jan 2024 19:40 )             41.1     01-08    137  |  101  |  23.4<H>  ----------------------------<  89  4.5   |  22.0  |  0.63    Ca    9.6      08 Jan 2024 19:40    TPro  6.8  /  Alb  4.2  /  TBili  1.0  /  DBili  x   /  AST  26  /  ALT  16  /  AlkPhos  54  01-08    PT/INR - ( 08 Jan 2024 19:40 )   PT: 16.8 sec;   INR: 1.53 ratio    PTT - ( 08 Jan 2024 19:40 )  PTT:49.0 sec    Urinalysis Basic - ( 08 Jan 2024 19:40 )  Color: x / Appearance: x / SG: x / pH: x  Gluc: 89 mg/dL / Ketone: x  / Bili: x / Urobili: x   Blood: x / Protein: x / Nitrite: x   Leuk Esterase: x / RBC: x / WBC x   Sq Epi: x / Non Sq Epi: x / Bacteria: x    RADIOLOGY & ADDITIONAL TESTS:      CT Lumbar Spine No Cont (01.08.24 @ 20:46)  IMPRESSION:  Moderate compression fracture of L3 vertebral body. Correlate for point tenderness.  MRI lumbar spine would be helpful for further assessment.    CT Head No Cont (01.08.24 @ 20:37)   Parenchymal volume loss and chronic microvascular ischemic changes identified  There is evidence of high attenuation seen involving the high left frontal region which is compatible with an area of acute subarachnoid hemorrhage. This corresponds to patient's history.  No significant shift or herniation is seen.  Evaluation of the osseous appropriate window appears unremarkable  Minimal right maxillary sinus mucosal thickening is seen.  Both mastoid and middle ear regions appear clear.  IMPRESSION: Acute subarachnoid hemorrhage as described above.         HPI:  82 y/o M with pmhx of HTN, HLD, afib on Eliquis, cardiac stents (2018), prostate surgery, right knee replacement, hernia surgery[place] [facility] [place]. Pt wife at bedside, noting that he has had "dizzy spells" and falls for the last 2-3 months. States that last Thursday, pt hit his head on the dresser with + HS/-LOC after feeling dizzy from getting up. Pt denies HA, SOB, CP, or abd pain at the time, but states he hurt his left lower back during the fall, which continues to intermittently hurt him now. Notes they called the cardiologist whom they were able to see today. Cardiologist ordered an outpatient CTH which demonstrated L frontal SAH [place] [facility] [place] [facility] [place], findings confirmed, and then sent here. Of note, wife states patient shuffles his feet ever since his knee replacement. Denies current neurological deficits but does not left lower back pain.     INTERVAL HPI/OVERNIGHT EVENTS:  83-year-old male seen lying comfortably in bed this morning on rounds. Denies headache, weakness, numbness, n/v/d, fevers, chills, chest pain, SOB.     Vital Signs Last 24 Hrs  T(C): 37.2 (09 Jan 2024 15:53), Max: 37.2 (09 Jan 2024 15:53)  T(F): 98.9 (09 Jan 2024 15:53), Max: 98.9 (09 Jan 2024 15:53)  HR: 58 (09 Jan 2024 15:53) (53 - 69)  BP: 116/67 (09 Jan 2024 15:53) (113/66 - 134/94)  RR: 17 (09 Jan 2024 15:53) (16 - 20)  SpO2: 96% (09 Jan 2024 15:53) (96% - 100%)  Parameters below as of 09 Jan 2024 15:53  Patient On (Oxygen Delivery Method): room air    PHYSICAL EXAM:  GENERAL: NAD, well-groomed  HEAD:  Atraumatic, normocephalic  EYES: Conjunctiva and sclera clear; corneal reflex intact   PRADEEP COMA SCORE: E- V- M- =15  CRANIAL NERVES: PERRL. EOMI without nystagmus. Facial sensation intact V1-3 distribution b/l. Face symmetric w/ normal eye closure and smile, tongue midline. Hearing grossly intact. Speech clear. Head turning and shoulder shrug intact.   MOTOR: Strength 5/5 b/l upper and lower extremities  SENSATION: Grossly intact to light touch all extremities  CHEST/LUNG: Chest rise and fall equally and bilaterally   ABDOMEN: Soft, nontender, nondistended   EXTREMITIES: No clubbing, cyanosis, or edema  SKIN: Warm, dry; no rashes or lesions    LABS:                        13.8   9.00  )-----------( 177      ( 08 Jan 2024 19:40 )             41.1     01-08    137  |  101  |  23.4<H>  ----------------------------<  89  4.5   |  22.0  |  0.63    Ca    9.6      08 Jan 2024 19:40    TPro  6.8  /  Alb  4.2  /  TBili  1.0  /  DBili  x   /  AST  26  /  ALT  16  /  AlkPhos  54  01-08    PT/INR - ( 08 Jan 2024 19:40 )   PT: 16.8 sec;   INR: 1.53 ratio    PTT - ( 08 Jan 2024 19:40 )  PTT:49.0 sec    Urinalysis Basic - ( 08 Jan 2024 19:40 )  Color: x / Appearance: x / SG: x / pH: x  Gluc: 89 mg/dL / Ketone: x  / Bili: x / Urobili: x   Blood: x / Protein: x / Nitrite: x   Leuk Esterase: x / RBC: x / WBC x   Sq Epi: x / Non Sq Epi: x / Bacteria: x    RADIOLOGY & ADDITIONAL TESTS:      CT Lumbar Spine No Cont (01.08.24 @ 20:46)  IMPRESSION:  Moderate compression fracture of L3 vertebral body. Correlate for point tenderness.  MRI lumbar spine would be helpful for further assessment.    CT Head No Cont (01.08.24 @ 20:37)   Parenchymal volume loss and chronic microvascular ischemic changes identified  There is evidence of high attenuation seen involving the high left frontal region which is compatible with an area of acute subarachnoid hemorrhage. This corresponds to patient's history.  No significant shift or herniation is seen.  Evaluation of the osseous appropriate window appears unremarkable  Minimal right maxillary sinus mucosal thickening is seen.  Both mastoid and middle ear regions appear clear.  IMPRESSION: Acute subarachnoid hemorrhage as described above.

## 2024-01-09 NOTE — H&P ADULT - ASSESSMENT
ASSESSMENT: Patient is a 83y old male with     PLAN:    - Admit to Trauma floor   - f/u neurosx recs   - Orthostatics BP   - Syncope and dizziness work up   - Brace for L3 fx   - PT eval   - regular diet   - pain control  - DVT ppx  - strict I/Os  - Plan discussed with Attending, Dr. Vega      ASSESSMENT: Patient is a 83y old male with small SAH    PLAN:    - Admit to Trauma floor   - f/u neurosx recs   - Orthostatics BP   - Syncope and dizziness work up   - Brace for L3 fx   - PT eval   - regular diet   - pain control  - DVT ppx  - strict I/Os  - Plan discussed with Attending, Dr. Vega

## 2024-01-09 NOTE — PROGRESS NOTE ADULT - ASSESSMENT
ASSESSMENT: 84yo M presenting s/p fall with SAH and L3 compression fx. Repeat CTH stable.    PLAN:  - LSO brace during prolonged sitting  - holding AC  - f/u syncope w/u  - f/u geriatric consult  - will need tertiary survey   - f/u PT

## 2024-01-09 NOTE — PHYSICAL THERAPY INITIAL EVALUATION ADULT - PHYSICAL ASSIST/NONPHYSICAL ASSIST: STAND/SIT, REHAB EVAL
Telephone Encounter by Meseret Watts LPN at 02/23/18 12:20 PM     Author:  Meseret Watts LPN Service:  (none) Author Type:  Licensed Nurse     Filed:  02/23/18 12:42 PM Encounter Date:  2/23/2018 Status:  Signed     :  Mesreet Watts LPN (Licensed Nurse)             left for Pepe notifying him that I spoke to Rosa at the [place] [facility]. She arranges transportation for the Seniors registered with  when Jill is not available. She stated she would call either him or me to notify if they found someone to drive him.  will also give Pepe information on [employer] when/if he calls me back.    Justyna Barbosa [facility][RR1.1M]      Revision History        User Key Date/Time User Provider Type Action    > RR1.1 02/23/18 12:42 PM Meseret Watts LPN Licensed Nurse Sign    M - Manual            
1 person assist

## 2024-01-09 NOTE — PATIENT PROFILE ADULT - FALL HARM RISK - HARM RISK INTERVENTIONS
Assistance with ambulation/Assistance OOB with selected safe patient handling equipment/Communicate Risk of Fall with Harm to all staff/Discuss with provider need for PT consult/Monitor gait and stability/Provide patient with walking aids - walker, cane, crutches/Reinforce activity limits and safety measures with patient and family/Sit up slowly, dangle for a short time, stand at bedside before walking/Tailored Fall Risk Interventions/Visual Cue: Yellow wristband and red socks/Bed in lowest position, wheels locked, appropriate side rails in place/Call bell, personal items and telephone in reach/Instruct patient to call for assistance before getting out of bed or chair/Non-slip footwear when patient is out of bed/[place] to call system/Physically safe environment - no spills, clutter or unnecessary equipment/Purposeful Proactive Rounding/Room/bathroom lighting operational, light cord in reach Assistance with ambulation/Assistance OOB with selected safe patient handling equipment/Communicate Risk of Fall with Harm to all staff/Discuss with provider need for PT consult/Monitor gait and stability/Provide patient with walking aids - walker, cane, crutches/Reinforce activity limits and safety measures with patient and family/Sit up slowly, dangle for a short time, stand at bedside before walking/Tailored Fall Risk Interventions/Visual Cue: Yellow wristband and red socks/Bed in lowest position, wheels locked, appropriate side rails in place/Call bell, personal items and telephone in reach/Instruct patient to call for assistance before getting out of bed or chair/Non-slip footwear when patient is out of bed/[place] to call system/Physically safe environment - no spills, clutter or unnecessary equipment/Purposeful Proactive Rounding/Room/bathroom lighting operational, light cord in reach

## 2024-01-09 NOTE — PROGRESS NOTE ADULT - SUBJECTIVE AND OBJECTIVE BOX
Jose was awake and alert in bed as I measured and sized an Aspen LSO for him to use OOB. I fit him with the brace as he rolled side to side in bed, and instructed him on donning and adjustment of the brace. Brace was then removed, labeled and placed on the side of his bed for later use. Printed instructions and contact info were provided.    [facility]  [phone]    Jose was awake and alert in bed as I measured and sized an Aspen LSO for him to use OOB. I fit him with the brace as he rolled side to side in bed, and instructed him on donning and adjustment of the brace. Brace was then removed, labeled and placed on the side of his bed for later use. Printed instructions and contact info were provided.    [facility]  [phone]

## 2024-01-09 NOTE — CHART NOTE - NSCHARTNOTEFT_GEN_A_CORE
Tertiary Trauma Survey (TTS)    Date of TTS: 01-09-24 @ 15:37                             Admit Date: 01-09-24 @ 00:49      Trauma Activation: consult[place] [facility] [place]    List Injuries Identified to Date: L3 vertebral body fracture, SAH        List Operative and Interventional Radiological Procedures:         Physical Exam:    Neuro: cranial nerves intact, no focal motor/sensory deficits     HEENT: No palpable facial/skull deformities, no ttp    Pulm/Chest: unlabored breathing, symmetric chest rise, no chest wall ttp, crepitus or deformities     Cardiac: RRR    GI / Abdomen: soft, nt and nd. No rebound or guarding     Musculoskeletal / Extremities: Normal ROM without pain in all extremities aside from chronic left shoulder pain. No c/t spine ttp. Lumbar ttp.    Integumentary: No significant skin tears       RADIOLOGICAL FINDINGS REVIEW:    < from: US Duplex Carotid Arteries Complete, Bilateral (01.09.24 @ 08:59) >    IMPRESSION: No significant hemodynamic stenosis of either carotid artery.    Measurement of carotid stenosis is based on velocity parameters that   correlate the residual internal carotid diameter with that of the more   distal vessel in accordance with a method such as the North American   Symptomatic Carotid Endarterectomy Trial (NASCET).        < from: CT Thoracic Spine No Cont (01.08.24 @ 20:46) >    IMPRESSION:    Moderate compression fracture of L3 vertebral body. Correlate for point   tenderness.    MRI lumbar spine would be helpful for further assessment.      < from: CT Lumbar Spine No Cont (01.08.24 @ 20:46) >    IMPRESSION:    Moderate compression fracture of L3 vertebral body. Correlate for point   tenderness.    MRI lumbar spine would be helpful for further assessment.      < from: CT Head No Cont (01.08.24 @ 20:37) >    IMPRESSION: Acute subarachnoid hemorrhage       INCIDENTAL FINDINGS:    [x ] No    [ ] Yes, Findings are:        [ x] Tertiary exam complete, there are no new injuries identified    [ ] Tertiary exam done, new injuries identified are:                [ ] Imaging ordered: Tertiary Trauma Survey (TTS)    Date of TTS: 01-09-24 @ 15:37                             Admit Date: 01-09-24 @ 00:49      Trauma Activation: consult[place] [facility] [place]    List Injuries Identified to Date: L3 vertebral body fracture, SAH        List Operative and Interventional Radiological Procedures:         Physical Exam:    Neuro: cranial nerves intact, no focal motor/sensory deficits     HEENT: No palpable facial/skull deformities, no ttp    Pulm/Chest: unlabored breathing, symmetric chest rise, no chest wall ttp, crepitus or deformities     Cardiac: RRR    GI / Abdomen: soft, nt and nd. No rebound or guarding     Musculoskeletal / Extremities: Normal ROM without pain in all extremities aside from chronic left shoulder pain. No c/t spine ttp. Lumbar ttp.    Integumentary: No significant skin tears       RADIOLOGICAL FINDINGS REVIEW:    < from: US Duplex Carotid Arteries Complete, Bilateral (01.09.24 @ 08:59) >    IMPRESSION: No significant hemodynamic stenosis of either carotid artery.    Measurement of carotid stenosis is based on velocity parameters that   correlate the residual internal carotid diameter with that of the more   distal vessel in accordance with a method such as the North American   Symptomatic Carotid Endarterectomy Trial (NASCET).        < from: CT Thoracic Spine No Cont (01.08.24 @ 20:46) >    IMPRESSION:    Moderate compression fracture of L3 vertebral body. Correlate for point   tenderness.    MRI lumbar spine would be helpful for further assessment.      < from: CT Lumbar Spine No Cont (01.08.24 @ 20:46) >    IMPRESSION:    Moderate compression fracture of L3 vertebral body. Correlate for point   tenderness.    MRI lumbar spine would be helpful for further assessment.      < from: CT Head No Cont (01.08.24 @ 20:37) >    IMPRESSION: Acute subarachnoid hemorrhage       INCIDENTAL FINDINGS:    [x ] No    [ ] Yes, Findings are:        [ x] Tertiary exam complete, there are no new injuries identified    [ ] Tertiary exam done, new injuries identified are:                [ ] Imaging ordered:

## 2024-01-09 NOTE — PROGRESS NOTE ADULT - SUBJECTIVE AND OBJECTIVE BOX
Very pleased with care given and all staff were very attentive, kind, and very professional and floor nurses and staff were great also.  Home health and PT working with patient at home.  Daughter staying with her 24/7.  Home health monitoring low blood pressure and encouraging fluids and increased  nutrition. Daughter states they understand all discharge instructions.   SUBJECTIVE: Pt resting comfortably. Endorsing only mild back pain. Hemodynamically stable.     Vitals:  T(C): 36.8 (01-09-24 @ 08:15), Max: 37 (01-08-24 @ 23:30)  T(F): 98.2 (01-09-24 @ 08:15), Max: 98.6 (01-08-24 @ 23:30)  HR: 58 (01-09-24 @ 11:15) (53 - 69)  BP: 117/66 (01-09-24 @ 11:15) (113/66 - 134/94)  ABP: --  ABP(mean): --  RR: 16 (01-09-24 @ 11:15) (16 - 20)  SpO2: 99% (01-09-24 @ 11:15) (97% - 100%)    LABS:  cret                        13.8   9.00  )-----------( 177      ( 08 Jan 2024 19:40 )             41.1     01-08    137  |  101  |  23.4<H>  ----------------------------<  89  4.5   |  22.0  |  0.63    Ca    9.6      08 Jan 2024 19:40    TPro  6.8  /  Alb  4.2  /  TBili  1.0  /  DBili  x   /  AST  26  /  ALT  16  /  AlkPhos  54  01-08    PT/INR - ( 08 Jan 2024 19:40 )   PT: 16.8 sec;   INR: 1.53 ratio         PTT - ( 08 Jan 2024 19:40 )  PTT:49.0 sec    PHYSICAL EXAM:   General: NAD, Lying in bed comfortably  Neuro: A+Ox3  HEENT: EOMI, PERRLA, MMM  Cardio: RRR  Resp: Non labored breathing on RA  GI/Abd: Soft, NT/ND, no rebound/guarding, no masses palpated  Vascular: All 4 extremities warm and well perfused.   Pelvis: stable  Musculoskeletal: All 4 extremities moving spontaneously, no limitations, no spinal tenderness.

## 2024-01-09 NOTE — CONSULT NOTE ADULT - SUBJECTIVE AND OBJECTIVE BOX
Patient is a 83y old  Male who presents with a chief complaint of SAH , L3 compression fx after mechanical fall .   Geriatric consult requested .   Patient seen and examined in ED , comfortable , AAX3 , denies sob/ chest pain , , c/o mild low back pain     CC: low back pain , s/p fall 5 days ago  , SAH , L 3 compression fx       HPI:      from H&P : 82 y/o M with pmhx of HTN, HLD, afib on Eliquis, shuffling gait , walks with walker , recurrent falls , CAD , s/p CABG( 2016)  , cardiac stents x3 , last 2008 , prostate cancer , s/p proctectomy  about 10 yrs ago ,  right knee replacement ( 2022) ,  hernia surgery, transferred from [facility] [place] for SAH s/p fall. Pt wife at bedside, noting that he has had "dizzy spells" and falls for the last 2-3 months. States that last Thursday, pt hit his head on the dresser with + HS/-LOC after feeling dizzy from getting up. Pt denies HA, SOB, CP, or abd pain at the time, but states he hurt his left lower back during the fall, which continues to intermittently hurt him now. Notes they called the cardiologist whom they were able to see today. Cardiologist ordered an outpatient CTH which demonstrated L frontal SAH and was sent to [facility] [place], eliquis was not reversed, findings confirmed, and then sent here. Of note, wife states patient shuffles his feet ever since his knee replacement.           PAST MEDICAL & SURGICAL HISTORY:    As above     Family Hx \  Prostate cancer - brother   MI father - at 78 yrs old      Social History:  Tobacco - denies   ETOH - occasionally   Illicit drug abuse - denies          Allergies    No Known Allergies    Intolerances        REVIEW OF SYSTEMS:    Low back pain , shuffling gait , all other systems are reviewed and are negative .       MEDICATIONS  (STANDING):  acetaminophen     Tablet .. 975 milliGRAM(s) Oral every 6 hours  atorvastatin 40 milliGRAM(s) Oral at bedtime  escitalopram 10 milliGRAM(s) Oral daily  lidocaine   4% Patch 2 Patch Transdermal every 24 hours  lisinopril 40 milliGRAM(s) Oral daily  methocarbamol 750 milliGRAM(s) Oral three times a day  senna 2 Tablet(s) Oral at bedtime    MEDICATIONS  (PRN):  HYDROmorphone  Injectable 0.2 milliGRAM(s) IV Push every 4 hours PRN breakthrough  oxyCODONE    IR 5 milliGRAM(s) Oral every 4 hours PRN Severe Pain (7 - 10)  oxyCODONE    IR 2.5 milliGRAM(s) Oral every 4 hours PRN Moderate Pain (4 - 6)      Vital Signs Last 24 Hrs  T(C): 37.2 (09 Jan 2024 15:53), Max: 37.2 (09 Jan 2024 15:53)  T(F): 98.9 (09 Jan 2024 15:53), Max: 98.9 (09 Jan 2024 15:53)  HR: 58 (09 Jan 2024 15:53) (53 - 69)  BP: 116/67 (09 Jan 2024 15:53) (113/66 - 134/94)  BP(mean): --  RR: 17 (09 Jan 2024 15:53) (16 - 20)  SpO2: 96% (09 Jan 2024 15:53) (96% - 100%)    Parameters below as of 09 Jan 2024 15:53  Patient On (Oxygen Delivery Method): room air        PHYSICAL EXAM:    GENERAL: NAD, well-groomed, well-developed  HEAD:  Atraumatic, Normocephalic  EYES: EOMI, PERRLA, conjunctiva and sclera clear  NECK: Supple, No JVD, Normal thyroid  NERVOUS SYSTEM:  Alert & Oriented X3 , NO FOCAL DEFICIT   CHEST/LUNG: CTA  b/l,  no rales, rhonchi, wheezing, or rubs  HEART: Regular rate and rhythm; No murmurs, rubs, or gallops  ABDOMEN: Soft, Nontender, Nondistended; Bowel sounds present  EXTREMITIES:  2+ Peripheral Pulses, No clubbing, cyanosis, or edema ,   LYMPH: No lymphadenopathy noted  SKIN: No rashes or lesions    LABS:                        13.8   9.00  )-----------( 177      ( 08 Jan 2024 19:40 )             41.1     01-08    137  |  101  |  23.4<H>  ----------------------------<  89  4.5   |  22.0  |  0.63    Ca    9.6      08 Jan 2024 19:40    TPro  6.8  /  Alb  4.2  /  TBili  1.0  /  DBili  x   /  AST  26  /  ALT  16  /  AlkPhos  54  01-08    PT/INR - ( 08 Jan 2024 19:40 )   PT: 16.8 sec;   INR: 1.53 ratio         PTT - ( 08 Jan 2024 19:40 )  PTT:49.0 sec        RADIOLOGY & ADDITIONAL STUDIES:    < from: CT Head No Cont (01.08.24 @ 20:37) >    ACC: [id number] EXAM:  CT BRAIN   ORDERED BY: KOFFI PADRON     PROCEDURE DATE:  01/08/2024        < end of copied text >  < from: CT Head No Cont (01.08.24 @ 20:37) >  IMPRESSION: Acute subarachnoid hemorrhage as described above.    --- End of Report ---      < end of copied text >    < from: CT Lumbar Spine No Cont (01.08.24 @ 20:46) >    ACC: [id number] EXAM:  CT LUMBAR SPINE   ORDERED BY: KOFFI PADRON     ACC: [id number] EXAM:  CT THORACIC SPINE   ORDERED BY: KOFFI PADRON     PROCEDURE DATE:  01/08/2024      < end of copied text >  < from: CT Lumbar Spine No Cont (01.08.24 @ 20:46) >    IMPRESSION:    Moderate compression fracture of L3 vertebral body. Correlate for point   tenderness.    MRI lumbar spine would be helpful for further assessment.    --- End of Report ---      < end of copied text >  < from: US Duplex Carotid Arteries Complete, Bilateral (01.09.24 @ 08:59) >    ACC: [id number] EXAM:  US DPLX CAROTIDS COMPL BI   ORDERED BY: SUMAN DAVIES     PROCEDURE DATE:  01/09/2024      < end of copied text >  < from: US Duplex Carotid Arteries Complete, Bilateral (01.09.24 @ 08:59) >    IMPRESSION: No significant hemodynamic stenosis of either carotid artery.    Measurement of carotid stenosis is based on velocity parameters that   correlate the residual internal carotid diameter with that of the more   distal vessel in accordance with a method such as the North American   Symptomatic Carotid Endarterectomy Trial (NASCET).    --- End of Report ---        < end of copied text >  < from: 12 Lead ECG (01.09.24 @ 09:47) >  Ventricular Rate 57 BPM    Atrial Rate 57 BPM    QRS Duration 134 ms    Q-T Interval 510 ms    QTC Calculation(Bazett) 496 ms    R Axis -39 degrees    T Axis 17 degrees    Diagnosis Line *** Poor data quality, interpretation may be adversely affected  at fib/junct rhythm  Left axis deviation  Right bundle branch block  Abnormal ECG    < end of copied text >    < from: TTE W or WO Ultrasound Enhancing Agent (01.09.24 @ 13:00) >    TRANSTHORACIC ECHOCARDIOGRAM REPORT  ________________________________________________________________________________                                      _______       Pt. Name:       IVAN MACKENZIE Study Date:    1/9/2024  MRN:            [id number]        YOB: 1940  Accession #:    [id number]       Age:           83 years  Account#:       [id number]      Gender:        M  Heart Rate:                     Height:        70.00 in (177.80 cm)  Rhythm:                         Weight:        162.00 lb (73.48 kg)  Blood Pressure: 113/66 mmHg     BSA/BMI:       1.91 m² / 23.24 kg/m²    < end of copied text >  < from: TTE W or WO Ultrasound Enhancing Agent (01.09.24 @ 13:00) >  CONCLUSIONS:      1. Technically difficult image quality.   2. Left ventricular systolic function is normal with an ejection fraction visually estimated at 60 to 65 %.   3. The right ventricle is not well visualized. probably normal systolic function.   4. The left atrium is mildly dilated.   5. No significant valvular disease.   6. No prior echocardiogram is available for comparison.    __________________________________________________________________________________    < end of copied text >               Patient is a 83y old  Male who presents with a chief complaint of SAH , L3 compression fx after mechanical fall .   Geriatric consult requested .   Patient seen and examined in ED , comfortable , AAX3 , denies sob/ chest pain , , c/o mild low back pain     CC: low back pain , s/p fall 5 days ago  , SAH , L 3 compression fx       HPI:      from H&P : 84 y/o M with pmhx of HTN, HLD, afib on Eliquis, shuffling gait , walks with walker , recurrent falls , CAD , s/p CABG( 2016)  , cardiac stents x3 , last 2008 , prostate cancer , s/p proctectomy  about 10 yrs ago ,  right knee replacement ( 2022) ,  hernia surgery, transferred from [facility] [place] for SAH s/p fall. Pt wife at bedside, noting that he has had "dizzy spells" and falls for the last 2-3 months. States that last Thursday, pt hit his head on the dresser with + HS/-LOC after feeling dizzy from getting up. Pt denies HA, SOB, CP, or abd pain at the time, but states he hurt his left lower back during the fall, which continues to intermittently hurt him now. Notes they called the cardiologist whom they were able to see today. Cardiologist ordered an outpatient CTH which demonstrated L frontal SAH and was sent to [facility], eliquis was not reversed, findings confirmed, and then sent here. Of note, wife states patient shuffles his feet ever since his knee replacement.           PAST MEDICAL & SURGICAL HISTORY:    As above     Family Hx \  Prostate cancer - brother   MI father - at 78 yrs old      Social History:  Tobacco - denies   ETOH - occasionally   Illicit drug abuse - denies          Allergies    No Known Allergies    Intolerances        REVIEW OF SYSTEMS:    Low back pain , shuffling gait , all other systems are reviewed and are negative .       MEDICATIONS  (STANDING):  acetaminophen     Tablet .. 975 milliGRAM(s) Oral every 6 hours  atorvastatin 40 milliGRAM(s) Oral at bedtime  escitalopram 10 milliGRAM(s) Oral daily  lidocaine   4% Patch 2 Patch Transdermal every 24 hours  lisinopril 40 milliGRAM(s) Oral daily  methocarbamol 750 milliGRAM(s) Oral three times a day  senna 2 Tablet(s) Oral at bedtime    MEDICATIONS  (PRN):  HYDROmorphone  Injectable 0.2 milliGRAM(s) IV Push every 4 hours PRN breakthrough  oxyCODONE    IR 5 milliGRAM(s) Oral every 4 hours PRN Severe Pain (7 - 10)  oxyCODONE    IR 2.5 milliGRAM(s) Oral every 4 hours PRN Moderate Pain (4 - 6)      Vital Signs Last 24 Hrs  T(C): 37.2 (09 Jan 2024 15:53), Max: 37.2 (09 Jan 2024 15:53)  T(F): 98.9 (09 Jan 2024 15:53), Max: 98.9 (09 Jan 2024 15:53)  HR: 58 (09 Jan 2024 15:53) (53 - 69)  BP: 116/67 (09 Jan 2024 15:53) (113/66 - 134/94)  BP(mean): --  RR: 17 (09 Jan 2024 15:53) (16 - 20)  SpO2: 96% (09 Jan 2024 15:53) (96% - 100%)    Parameters below as of 09 Jan 2024 15:53  Patient On (Oxygen Delivery Method): room air        PHYSICAL EXAM:    GENERAL: NAD, well-groomed, well-developed  HEAD:  Atraumatic, Normocephalic  EYES: EOMI, PERRLA, conjunctiva and sclera clear  NECK: Supple, No JVD, Normal thyroid  NERVOUS SYSTEM:  Alert & Oriented X3 , NO FOCAL DEFICIT   CHEST/LUNG: CTA  b/l,  no rales, rhonchi, wheezing, or rubs  HEART: Regular rate and rhythm; No murmurs, rubs, or gallops  ABDOMEN: Soft, Nontender, Nondistended; Bowel sounds present  EXTREMITIES:  2+ Peripheral Pulses, No clubbing, cyanosis, or edema ,   LYMPH: No lymphadenopathy noted  SKIN: No rashes or lesions    LABS:                        13.8   9.00  )-----------( 177      ( 08 Jan 2024 19:40 )             41.1     01-08    137  |  101  |  23.4<H>  ----------------------------<  89  4.5   |  22.0  |  0.63    Ca    9.6      08 Jan 2024 19:40    TPro  6.8  /  Alb  4.2  /  TBili  1.0  /  DBili  x   /  AST  26  /  ALT  16  /  AlkPhos  54  01-08    PT/INR - ( 08 Jan 2024 19:40 )   PT: 16.8 sec;   INR: 1.53 ratio         PTT - ( 08 Jan 2024 19:40 )  PTT:49.0 sec        RADIOLOGY & ADDITIONAL STUDIES:    < from: CT Head No Cont (01.08.24 @ 20:37) >    ACC: [id number] EXAM:  CT BRAIN   ORDERED BY: KOFFI PADRON     PROCEDURE DATE:  01/08/2024        < end of copied text >  < from: CT Head No Cont (01.08.24 @ 20:37) >  IMPRESSION: Acute subarachnoid hemorrhage as described above.    --- End of Report ---      < end of copied text >    < from: CT Lumbar Spine No Cont (01.08.24 @ 20:46) >    ACC: [id number] EXAM:  CT LUMBAR SPINE   ORDERED BY: KOFFI PADRON     ACC: [id number] EXAM:  CT THORACIC SPINE   ORDERED BY: KOFFI PADRON     PROCEDURE DATE:  01/08/2024      < end of copied text >  < from: CT Lumbar Spine No Cont (01.08.24 @ 20:46) >    IMPRESSION:    Moderate compression fracture of L3 vertebral body. Correlate for point   tenderness.    MRI lumbar spine would be helpful for further assessment.    --- End of Report ---      < end of copied text >  < from: US Duplex Carotid Arteries Complete, Bilateral (01.09.24 @ 08:59) >    ACC: [id number] EXAM:  US DPLX CAROTIDS COMPL BI   ORDERED BY: SUMAN DAVIES     PROCEDURE DATE:  01/09/2024      < end of copied text >  < from: US Duplex Carotid Arteries Complete, Bilateral (01.09.24 @ 08:59) >    IMPRESSION: No significant hemodynamic stenosis of either carotid artery.    Measurement of carotid stenosis is based on velocity parameters that   correlate the residual internal carotid diameter with that of the more   distal vessel in accordance with a method such as the North American   Symptomatic Carotid Endarterectomy Trial (NASCET).    --- End of Report ---        < end of copied text >  < from: 12 Lead ECG (01.09.24 @ 09:47) >  Ventricular Rate 57 BPM    Atrial Rate 57 BPM    QRS Duration 134 ms    Q-T Interval 510 ms    QTC Calculation(Bazett) 496 ms    R Axis -39 degrees    T Axis 17 degrees    Diagnosis Line *** Poor data quality, interpretation may be adversely affected  at fib/junct rhythm  Left axis deviation  Right bundle branch block  Abnormal ECG    < end of copied text >    < from: TTE W or WO Ultrasound Enhancing Agent (01.09.24 @ 13:00) >    TRANSTHORACIC ECHOCARDIOGRAM REPORT  ________________________________________________________________________________                                      _______       Pt. Name:       IVAN MACKENZIE Study Date:    1/9/2024  MRN:            [id number]        YOB: 1940  Accession #:    [id number]       Age:           83 years  Account#:       [id number]      Gender:        M  Heart Rate:                     Height:        70.00 in (177.80 cm)  Rhythm:                         Weight:        162.00 lb (73.48 kg)  Blood Pressure: 113/66 mmHg     BSA/BMI:       1.91 m² / 23.24 kg/m²    < end of copied text >  < from: TTE W or WO Ultrasound Enhancing Agent (01.09.24 @ 13:00) >  CONCLUSIONS:      1. Technically difficult image quality.   2. Left ventricular systolic function is normal with an ejection fraction visually estimated at 60 to 65 %.   3. The right ventricle is not well visualized. probably normal systolic function.   4. The left atrium is mildly dilated.   5. No significant valvular disease.   6. No prior echocardiogram is available for comparison.    __________________________________________________________________________________    < end of copied text >

## 2024-01-09 NOTE — PHYSICAL THERAPY INITIAL EVALUATION ADULT - ADDITIONAL COMMENTS
Pt. lives in a house with his wife who is available at all times to supervise. Daughter lives close by and reports she will be there frequently. Pt. with 3 BLAIR and one rail and a flight inside with one rail. Pt. can stay on 1st floor initially. Pt was independent vs modified independent with RW. Does not own any other DME.

## 2024-01-09 NOTE — CHART NOTE - NSCHARTNOTEFT_GEN_A_CORE
-24 Hour CTH done and preliminary stable to slightly improved. No role for neurosurgical intervention  -Case and discussed with Dr. Muirllo  -SAMANTHAO brace when OOB  -Hold Eliquis to for 2 weeks  -Follow up in clinic with Dr. Murillo in 2 weeks  -Neurosurgery signing off -24 Hour CTH done and preliminary stable to slightly improved. No role for neurosurgical intervention  -Case and discussed with Dr. Murillo  -SAMANTHAO brace when OOB  -Hold Eliquis to for 2 weeks  -Follow up in clinic with Dr. Murillo in 2 weeks  -Neurosurgery signing off

## 2024-01-09 NOTE — H&P ADULT - HISTORY OF PRESENT ILLNESS
HPI: 84 y/o M with pmhx of HTN, HLD, afib on Eliquis" last dose was 2 days ago", cardiac stents (2018), prostate surgery, right knee replacement, hernia surgery, transferred from [facility] for SAH s/p fall last Thursday, patient states he felt dizzy lost his balance fell when he was trying to get out of bed , + HS , no LOC , complaining of lower back pain .     from H&P : 84 y/o M with pmhx of HTN, HLD, afib on Eliquis, cardiac stents (2018), prostate surgery, right knee replacement, hernia surgery, transferred from [facility] for SAH s/p fall. Pt wife at bedside, noting that he has had "dizzy spells" and falls for the last 2-3 months. States that last Thursday, pt hit his head on the dresser with + HS/-LOC after feeling dizzy from getting up. Pt denies HA, SOB, CP, or abd pain at the time, but states he hurt his left lower back during the fall, which continues to intermittently hurt him now. Notes they called the cardiologist whom they were able to see today. Cardiologist ordered an outpatient CTH which demonstrated L frontal SAH and was sent to [facility], eliquis was not reversed, findings confirmed, and then sent here. Of note, wife states patient shuffles his feet ever since his knee replacement. Denies current neurological deficits but does not left lower back pain. ":     ROS: 10-system review is otherwise negative except HPI above.      PAST MEDICAL & SURGICAL HISTORY:    FAMILY HISTORY:    Family history not pertinent as reviewed with the patient.    SOCIAL HISTORY:  Denies any toxic habits    ALLERGIES: NKA No Known Allergies      HOME MEDICATIONS:   Home Medications:  azelastine 137 mcg/inh (0.1%) nasal spray: 2 spray(s) intranasally once a day (09 Jan 2024 00:58)  escitalopram 10 mg oral tablet: 1 tab(s) orally once a day (at bedtime) (09 Jan 2024 00:59)  lisinopril 40 mg oral tablet: 1 tab(s) orally once a day (09 Jan 2024 00:59)  simvastatin 40 mg oral tablet: 1 tab(s) orally once a day (09 Jan 2024 00:59)      --------------------------------------------------------------------------------------------    PHYSICAL EXAM:   General: NAD, Lying in bed comfortably  Neuro: A+Ox3  HEENT: EOMI, PERRLA, MMM  Cardio: RRR  Resp: Non labored breathing on RA  GI/Abd: Soft, NT/ND, no rebound/guarding, no masses palpated  Vascular: All 4 extremities warm and well perfused.   Pelvis: stable  Musculoskeletal: All 4 extremities moving spontaneously, no limitations, no spinal tenderness.  --------------------------------------------------------------------------------------------    LABS                 13.8   9.00   )----------(  177       ( 08 Jan 2024 19:40 )               41.1      137    |  101    |  23.4   ----------------------------<  89         ( 08 Jan 2024 19:40 )  4.5     |  22.0   |  0.63     Ca    9.6        ( 08 Jan 2024 19:40 )    TPro  6.8    /  Alb  4.2    /  TBili  1.0    /  DBili  x      /  AST  26     /  ALT  16     /  AlkPhos  54     ( 08 Jan 2024 19:40 )    LIVER FUNCTIONS - ( 08 Jan 2024 19:40 )  Alb: 4.2 g/dL / Pro: 6.8 g/dL / ALK PHOS: 54 U/L / ALT: 16 U/L / AST: 26 U/L / GGT: x           PT/INR -  16.8 sec / 1.53 ratio   ( 08 Jan 2024 19:40 )       PTT -  49.0 sec   ( 08 Jan 2024 19:40 )  CAPILLARY BLOOD GLUCOSE        Urinalysis Basic - ( 08 Jan 2024 19:40 )    Color: x / Appearance: x / SG: x / pH: x  Gluc: 89 mg/dL / Ketone: x  / Bili: x / Urobili: x   Blood: x / Protein: x / Nitrite: x   Leuk Esterase: x / RBC: x / WBC x   Sq Epi: x / Non Sq Epi: x / Bacteria: x          --------------------------------------------------------------------------------------------  IMAGING  < from: CT Thoracic Spine No Cont (01.08.24 @ 20:46) >    IMPRESSION:    Moderate compression fracture of L3 vertebral body. Correlate for point   tenderness.    MRI lumbar spine would be helpful for further assessment.    --- End of Report ---    < end of copied text >       HPI: 82 y/o M with pmhx of HTN, HLD, afib on Eliquis" last dose was 2 days ago", cardiac stents (2018), prostate surgery, right knee replacement, hernia surgery, transferred from [facility] [place] for SAH s/p fall last Thursday, patient states he felt dizzy lost his balance fell when he was trying to get out of bed , + HS , no LOC , complaining of lower back pain .     from H&P : 82 y/o M with pmhx of HTN, HLD, afib on Eliquis, cardiac stents (2018), prostate surgery, right knee replacement, hernia surgery, transferred from [facility] [place] for SAH s/p fall. Pt wife at bedside, noting that he has had "dizzy spells" and falls for the last 2-3 months. States that last Thursday, pt hit his head on the dresser with + HS/-LOC after feeling dizzy from getting up. Pt denies HA, SOB, CP, or abd pain at the time, but states he hurt his left lower back during the fall, which continues to intermittently hurt him now. Notes they called the cardiologist whom they were able to see today. Cardiologist ordered an outpatient CTH which demonstrated L frontal SAH and was sent to [facility] [place], eliquis was not reversed, findings confirmed, and then sent here. Of note, wife states patient shuffles his feet ever since his knee replacement. Denies current neurological deficits but does not left lower back pain. ":     ROS: 10-system review is otherwise negative except HPI above.      PAST MEDICAL & SURGICAL HISTORY:    FAMILY HISTORY:    Family history not pertinent as reviewed with the patient.    SOCIAL HISTORY:  Denies any toxic habits    ALLERGIES: NKA No Known Allergies      HOME MEDICATIONS:   Home Medications:  azelastine 137 mcg/inh (0.1%) nasal spray: 2 spray(s) intranasally once a day (09 Jan 2024 00:58)  escitalopram 10 mg oral tablet: 1 tab(s) orally once a day (at bedtime) (09 Jan 2024 00:59)  lisinopril 40 mg oral tablet: 1 tab(s) orally once a day (09 Jan 2024 00:59)  simvastatin 40 mg oral tablet: 1 tab(s) orally once a day (09 Jan 2024 00:59)      --------------------------------------------------------------------------------------------    PHYSICAL EXAM:   General: NAD, Lying in bed comfortably  Neuro: A+Ox3  HEENT: EOMI, PERRLA, MMM  Cardio: RRR  Resp: Non labored breathing on RA  GI/Abd: Soft, NT/ND, no rebound/guarding, no masses palpated  Vascular: All 4 extremities warm and well perfused.   Pelvis: stable  Musculoskeletal: All 4 extremities moving spontaneously, no limitations, no spinal tenderness.  --------------------------------------------------------------------------------------------    LABS                 13.8   9.00   )----------(  177       ( 08 Jan 2024 19:40 )               41.1      137    |  101    |  23.4   ----------------------------<  89         ( 08 Jan 2024 19:40 )  4.5     |  22.0   |  0.63     Ca    9.6        ( 08 Jan 2024 19:40 )    TPro  6.8    /  Alb  4.2    /  TBili  1.0    /  DBili  x      /  AST  26     /  ALT  16     /  AlkPhos  54     ( 08 Jan 2024 19:40 )    LIVER FUNCTIONS - ( 08 Jan 2024 19:40 )  Alb: 4.2 g/dL / Pro: 6.8 g/dL / ALK PHOS: 54 U/L / ALT: 16 U/L / AST: 26 U/L / GGT: x           PT/INR -  16.8 sec / 1.53 ratio   ( 08 Jan 2024 19:40 )       PTT -  49.0 sec   ( 08 Jan 2024 19:40 )  CAPILLARY BLOOD GLUCOSE        Urinalysis Basic - ( 08 Jan 2024 19:40 )    Color: x / Appearance: x / SG: x / pH: x  Gluc: 89 mg/dL / Ketone: x  / Bili: x / Urobili: x   Blood: x / Protein: x / Nitrite: x   Leuk Esterase: x / RBC: x / WBC x   Sq Epi: x / Non Sq Epi: x / Bacteria: x          --------------------------------------------------------------------------------------------  IMAGING  < from: CT Thoracic Spine No Cont (01.08.24 @ 20:46) >    IMPRESSION:    Moderate compression fracture of L3 vertebral body. Correlate for point   tenderness.    MRI lumbar spine would be helpful for further assessment.    --- End of Report ---    < end of copied text >

## 2024-01-09 NOTE — PROGRESS NOTE ADULT - ASSESSMENT
84 y/o male on ASA/Eliquis for afib/cardiac stents s/p +HS/-LOC to dresser after falling from getting up. CTH demonstrates L frontal SAH.     Plan:   - Pain control PRN, avoid oversedation  - 6 hour CTH stability scan   - CT T/L spine for back pain   - BP <160   - hold AC/AP   - Syncope and dizziness work up   - scans for back  82 y/o male on ASA/Eliquis for afib/cardiac stents s/p +HS/-LOC to dresser after falling from getting up. CTH demonstrates L frontal SAH.     Plan:   - Pain control PRN, avoid oversedation  - 6 hour CTH stability scan   - CT T/L spine for back pain   - BP <160   - hold AC/AP   - Syncope and dizziness work up   - scans for back  84 y/o male on ASA/Eliquis for afib/cardiac stents s/p +HS/-LOC to dresser after falling from getting up. CTH demonstrates L frontal SAH and L3 compression fx.    Plan:   - Pain control PRN, avoid oversedation  - TLSO brace for comfort  - 24h stability scan for SAH on Eliquis ordered, pending  - No acute neurosurgical interventions at this time  - Continue to hold AC/AP  - Syncope work-up  - Further management per primary team  - D/w Dr. Murillo

## 2024-01-09 NOTE — PATIENT PROFILE ADULT - OVER THE PAST TWO WEEKS HAVE YOU FELT DOWN, DEPRESSED OR HOPELESS?
IM route not recommended, so contacted MD. Dr. Sanabria said patient doesn't have IV line, so wants it IM
Patient is  a 88 yo F with past medical history of urinary retention/overactive bladder, breast cancer (s/p R mastectomy ~15 years ago), and osteoporosis, who presented with back pain secondary to mechanical fall, found to have normal CXR, no acute fracture, hemorrhage, or soft tissue swelling on CT head, CT lumbar spine, or CT cervical spine. EKG showed normal sinus rhythm. Back pain is currently well controlled. Patient reports she has home health aide daily 8AM-8PM since 2 months ago.
no

## 2024-01-09 NOTE — CONSULT NOTE ADULT - ASSESSMENT
from H&P : 84 y/o M with pmhx of HTN, HLD, afib on Eliquis, shuffling gait , walks with walker , recurrent falls , CAD , s/p CABG( 2016)  , cardiac stents x3 , last 2008 , prostate cancer , s/p proctectomy  about 10 yrs ago ,  right knee replacement ( 2022) ,  hernia surgery, transferred from [facility] for SAH s/p fall. Pt wife at bedside, noting that he has had "dizzy spells" and falls for the last 2-3 months. States that last Thursday, pt hit his head on the dresser with + HS/-LOC after feeling dizzy from getting up. Pt denies HA, SOB, CP, or abd pain at the time, but states he hurt his left lower back during the fall, which continues to intermittently hurt him now. Notes they called the cardiologist whom they were able to see today. Cardiologist ordered an outpatient CTH which demonstrated L frontal SAH and was sent to [facility], eliquis was not reversed, findings confirmed, and then sent here. Of note, wife states patient shuffles his feet ever since his knee replacement.     1. Traumatic SDH / L3 fracture   patient is stable   NS noted and appreciated    Pain control PRN, avoid oversedation  - TLSO brace for comfort  - 24h stability scan for SAH on Eliquis ordered, pending  - No acute neurosurgical interventions at this time  - Continue to hold AC/AP  - Syncope work-up  - ECHO - nl EF 60-65%  - U/S of carotids - no stenosis  - check orthostatic virals once able to stand   - cardiology monitoring   - cardiology eval     2. Hx of Afib - rate controlled , hold Eliquis for now   patient with hx of recurrent falls - high risk to be on Eliquis ,   ? EP eval for [facility]     3. HTN - continue Lisinopril 40 mg daily with parameters     4. CAD/ stents x3 , last 2008 - hold ASA for now , may restart once cleared by NS, continue statin     5. Sniffling gait , uses walker , recurrent falls - PT- eval     Identification of Seniors at Risk:                                                                                                                                       Before the event that brought you to the hospital, did you need someone to help you on a regular basis?   NO  In the 24 hours before your injury, have you needed more help than usual?                                                NO           Have you been hospitalized for one or more nights during the past six months?                                         NO           In general, do you have serious problems with your vision that cannot be corrected with glasses?                NO - uses glasses    In general, do you have serious problems with your memory?                                                                    NO but becoming forgetful                 Do you take six or more different medications every day?                                                                          NO                    A positive screen is an answer  of yes to 2 or more - Total:  1  Screen  is negative .     Will recommend SW to asses home safety   PT- eval , check Vit B12, TFT - as patient is becoming forgetful .   Thank you for the courtesy of this consult .  Will follow along with you .                                 from H&P : 82 y/o M with pmhx of HTN, HLD, afib on Eliquis, shuffling gait , walks with walker , recurrent falls , CAD , s/p CABG( 2016)  , cardiac stents x3 , last 2008 , prostate cancer , s/p proctectomy  about 10 yrs ago ,  right knee replacement ( 2022) ,  hernia surgery, transferred from [facility] for SAH s/p fall. Pt wife at bedside, noting that he has had "dizzy spells" and falls for the last 2-3 months. States that last Thursday, pt hit his head on the dresser with + HS/-LOC after feeling dizzy from getting up. Pt denies HA, SOB, CP, or abd pain at the time, but states he hurt his left lower back during the fall, which continues to intermittently hurt him now. Notes they called the cardiologist whom they were able to see today. Cardiologist ordered an outpatient CTH which demonstrated L frontal SAH and was sent to [facility], eliquis was not reversed, findings confirmed, and then sent here. Of note, wife states patient shuffles his feet ever since his knee replacement.     1. Traumatic SDH / L3 fracture   patient is stable   NS noted and appreciated    Pain control PRN, avoid oversedation  - TLSO brace for comfort  - 24h stability scan for SAH on Eliquis ordered, pending  - No acute neurosurgical interventions at this time  - Continue to hold AC/AP  - Syncope work-up  - ECHO - nl EF 60-65%  - U/S of carotids - no stenosis  - check orthostatic virals once able to stand   - cardiology monitoring   - cardiology eval     2. Hx of Afib - rate controlled , hold Eliquis for now   patient with hx of recurrent falls - high risk to be on Eliquis ,   ? EP eval for [facility]     3. HTN - continue Lisinopril 40 mg daily with parameters     4. CAD/ stents x3 , last 2008 - hold ASA for now , may restart once cleared by NS, continue statin     5. Sniffling gait , uses walker , recurrent falls - PT- eval     Identification of Seniors at Risk:                                                                                                                                       Before the event that brought you to the hospital, did you need someone to help you on a regular basis?   NO  In the 24 hours before your injury, have you needed more help than usual?                                                NO           Have you been hospitalized for one or more nights during the past six months?                                         NO           In general, do you have serious problems with your vision that cannot be corrected with glasses?                NO - uses glasses    In general, do you have serious problems with your memory?                                                                    NO but becoming forgetful                 Do you take six or more different medications every day?                                                                          NO                    A positive screen is an answer  of yes to 2 or more - Total:  1  Screen  is negative .     Will recommend SW to asses home safety   PT- eval , check Vit B12, TFT - as patient is becoming forgetful .   Thank you for the courtesy of this consult .  Will follow along with you .

## 2024-01-09 NOTE — H&P ADULT - ATTENDING COMMENTS
Patient examined by me in ED. Presents due to dizziness s/p fall 4 days ago.   Initially presented to OSH where a small SAH was discovered.   Transferred to [facility] for further evaluation.    PMH significant for Afib on Eliquis and Cardiac stent on 2018 on ASA.   Last dose of Eliquis >24 hours ago.     Vitals stable   No significant evidence of trauma on exam.     CTH show small SAH   CT T/L spine reveals L3 compression deformity.     A/P: s/p fall on Eliquis/ASA 4 days ago with SAH and L3 compression deformities  -Admit to Trauma  -Appreciate spine team recs. Brace for L3 compression fx.   -Repeat CTH to assess stability   -Hold Eliquis/ASA  -PT evaluation Patient examined by me in ED. Presents due to dizziness s/p fall 4 days ago.   Initially presented to OSH where a small SAH was discovered.   Transferred to [facility] for further evaluation.    PMH significant for Afib on Eliquis and Cardiac stent on 2018 on ASA.   Last dose of Eliquis >24 hours ago.     Vitals stable   No significant evidence of trauma on exam.     CTH show small SAH   CT T/L spine reveals L3 compression deformity.     A/P: s/p fall on Eliquis/ASA 4 days ago with SAH and L3 compression deformities  -Admit to Trauma  -Appreciate spine team recs. Brace for L3 compression fx.   -Repeat CTH to assess stability   -Hold Eliquis/ASA  -PT evaluation Patient examined by me in ED. Presents due to dizziness and lower back pain s/p fall 4 days ago.   Initially presented to OSH where a small SAH was discovered.   Transferred to [facility] for further evaluation.    PMH significant for Afib on Eliquis and Cardiac stent on 2018 on ASA.   Last dose of Eliquis >24 hours ago.     Vitals stable   GCS 15, neurologically intact.   No significant evidence of trauma on exam.     CTH show small SAH   CT T/L spine reveals L3 compression deformity.     A/P: s/p fall on Eliquis/ASA 4 days ago with SAH and L3 compression deformities  -Admit to Trauma  -Repeat CTH to assess stability of SAH  -As patient fell 4 days ago with reliable exam, no further imaging recommended besides repeat CTH.   -Appreciate spine team recs. Brace for L3 compression fx.   -Hold Eliquis/ASA  -PT evaluation Patient examined by me in ED. Presents due to dizziness and lower back pain s/p fall 4 days ago.   Initially presented to OSH where a small SAH was discovered.   Transferred to [facility] for further evaluation.    PMH significant for Afib on Eliquis and Cardiac stent on 2018 on ASA.   Last dose of Eliquis >24 hours ago.     Vitals stable   GCS 15, neurologically intact.   No significant evidence of trauma on exam.     CTH show small SAH   CT T/L spine reveals L3 compression deformity.     A/P: s/p fall on Eliquis/ASA 4 days ago with SAH and L3 compression deformities  -Admit to Trauma  -Repeat CTH to assess stability of SAH  -As patient fell 4 days ago with reliable exam, no further imaging recommended besides repeat CTH.   -Appreciate spine team recs. Brace for L3 compression fx.   -Hold Eliquis/ASA  -PT evaluation

## 2024-01-10 ENCOUNTER — TRANSCRIPTION ENCOUNTER (OUTPATIENT)
Age: 84
End: 2024-01-10

## 2024-01-10 VITALS — TEMPERATURE: 98 F | OXYGEN SATURATION: 98 % | RESPIRATION RATE: 19 BRPM

## 2024-01-10 DIAGNOSIS — I60.9 NONTRAUMATIC SUBARACHNOID HEMORRHAGE, UNSPECIFIED: ICD-10-CM

## 2024-01-10 DIAGNOSIS — I48.91 UNSPECIFIED ATRIAL FIBRILLATION: ICD-10-CM

## 2024-01-10 DIAGNOSIS — I25.10 ATHEROSCLEROTIC HEART DISEASE OF NATIVE CORONARY ARTERY WITHOUT ANGINA PECTORIS: ICD-10-CM

## 2024-01-10 LAB
ALBUMIN SERPL ELPH-MCNC: 4 G/DL — SIGNIFICANT CHANGE UP (ref 3.3–5.2)
ALBUMIN SERPL ELPH-MCNC: 4 G/DL — SIGNIFICANT CHANGE UP (ref 3.3–5.2)
ALP SERPL-CCNC: 55 U/L — SIGNIFICANT CHANGE UP (ref 40–120)
ALP SERPL-CCNC: 55 U/L — SIGNIFICANT CHANGE UP (ref 40–120)
ALT FLD-CCNC: 15 U/L — SIGNIFICANT CHANGE UP
ALT FLD-CCNC: 15 U/L — SIGNIFICANT CHANGE UP
ANION GAP SERPL CALC-SCNC: 16 MMOL/L — SIGNIFICANT CHANGE UP (ref 5–17)
ANION GAP SERPL CALC-SCNC: 16 MMOL/L — SIGNIFICANT CHANGE UP (ref 5–17)
AST SERPL-CCNC: 26 U/L — SIGNIFICANT CHANGE UP
AST SERPL-CCNC: 26 U/L — SIGNIFICANT CHANGE UP
BASOPHILS # BLD AUTO: 0.05 K/UL — SIGNIFICANT CHANGE UP (ref 0–0.2)
BASOPHILS # BLD AUTO: 0.05 K/UL — SIGNIFICANT CHANGE UP (ref 0–0.2)
BASOPHILS NFR BLD AUTO: 0.7 % — SIGNIFICANT CHANGE UP (ref 0–2)
BASOPHILS NFR BLD AUTO: 0.7 % — SIGNIFICANT CHANGE UP (ref 0–2)
BILIRUB SERPL-MCNC: 0.9 MG/DL — SIGNIFICANT CHANGE UP (ref 0.4–2)
BILIRUB SERPL-MCNC: 0.9 MG/DL — SIGNIFICANT CHANGE UP (ref 0.4–2)
BUN SERPL-MCNC: 22.6 MG/DL — HIGH (ref 8–20)
BUN SERPL-MCNC: 22.6 MG/DL — HIGH (ref 8–20)
CALCIUM SERPL-MCNC: 9.5 MG/DL — SIGNIFICANT CHANGE UP (ref 8.4–10.5)
CALCIUM SERPL-MCNC: 9.5 MG/DL — SIGNIFICANT CHANGE UP (ref 8.4–10.5)
CHLORIDE SERPL-SCNC: 100 MMOL/L — SIGNIFICANT CHANGE UP (ref 96–108)
CHLORIDE SERPL-SCNC: 100 MMOL/L — SIGNIFICANT CHANGE UP (ref 96–108)
CO2 SERPL-SCNC: 20 MMOL/L — LOW (ref 22–29)
CO2 SERPL-SCNC: 20 MMOL/L — LOW (ref 22–29)
CREAT SERPL-MCNC: 0.66 MG/DL — SIGNIFICANT CHANGE UP (ref 0.5–1.3)
CREAT SERPL-MCNC: 0.66 MG/DL — SIGNIFICANT CHANGE UP (ref 0.5–1.3)
EGFR: 92 ML/MIN/1.73M2 — SIGNIFICANT CHANGE UP
EGFR: 92 ML/MIN/1.73M2 — SIGNIFICANT CHANGE UP
EOSINOPHIL # BLD AUTO: 0.44 K/UL — SIGNIFICANT CHANGE UP (ref 0–0.5)
EOSINOPHIL # BLD AUTO: 0.44 K/UL — SIGNIFICANT CHANGE UP (ref 0–0.5)
EOSINOPHIL NFR BLD AUTO: 5.8 % — SIGNIFICANT CHANGE UP (ref 0–6)
EOSINOPHIL NFR BLD AUTO: 5.8 % — SIGNIFICANT CHANGE UP (ref 0–6)
GLUCOSE SERPL-MCNC: 87 MG/DL — SIGNIFICANT CHANGE UP (ref 70–99)
GLUCOSE SERPL-MCNC: 87 MG/DL — SIGNIFICANT CHANGE UP (ref 70–99)
HCT VFR BLD CALC: 41.8 % — SIGNIFICANT CHANGE UP (ref 39–50)
HCT VFR BLD CALC: 41.8 % — SIGNIFICANT CHANGE UP (ref 39–50)
HGB BLD-MCNC: 13.8 G/DL — SIGNIFICANT CHANGE UP (ref 13–17)
HGB BLD-MCNC: 13.8 G/DL — SIGNIFICANT CHANGE UP (ref 13–17)
IMM GRANULOCYTES NFR BLD AUTO: 0.3 % — SIGNIFICANT CHANGE UP (ref 0–0.9)
IMM GRANULOCYTES NFR BLD AUTO: 0.3 % — SIGNIFICANT CHANGE UP (ref 0–0.9)
LYMPHOCYTES # BLD AUTO: 1.83 K/UL — SIGNIFICANT CHANGE UP (ref 1–3.3)
LYMPHOCYTES # BLD AUTO: 1.83 K/UL — SIGNIFICANT CHANGE UP (ref 1–3.3)
LYMPHOCYTES # BLD AUTO: 24 % — SIGNIFICANT CHANGE UP (ref 13–44)
LYMPHOCYTES # BLD AUTO: 24 % — SIGNIFICANT CHANGE UP (ref 13–44)
MAGNESIUM SERPL-MCNC: 2.1 MG/DL — SIGNIFICANT CHANGE UP (ref 1.6–2.6)
MAGNESIUM SERPL-MCNC: 2.1 MG/DL — SIGNIFICANT CHANGE UP (ref 1.6–2.6)
MCHC RBC-ENTMCNC: 32.5 PG — SIGNIFICANT CHANGE UP (ref 27–34)
MCHC RBC-ENTMCNC: 32.5 PG — SIGNIFICANT CHANGE UP (ref 27–34)
MCHC RBC-ENTMCNC: 33 GM/DL — SIGNIFICANT CHANGE UP (ref 32–36)
MCHC RBC-ENTMCNC: 33 GM/DL — SIGNIFICANT CHANGE UP (ref 32–36)
MCV RBC AUTO: 98.4 FL — SIGNIFICANT CHANGE UP (ref 80–100)
MCV RBC AUTO: 98.4 FL — SIGNIFICANT CHANGE UP (ref 80–100)
MONOCYTES # BLD AUTO: 0.78 K/UL — SIGNIFICANT CHANGE UP (ref 0–0.9)
MONOCYTES # BLD AUTO: 0.78 K/UL — SIGNIFICANT CHANGE UP (ref 0–0.9)
MONOCYTES NFR BLD AUTO: 10.2 % — SIGNIFICANT CHANGE UP (ref 2–14)
MONOCYTES NFR BLD AUTO: 10.2 % — SIGNIFICANT CHANGE UP (ref 2–14)
NEUTROPHILS # BLD AUTO: 4.52 K/UL — SIGNIFICANT CHANGE UP (ref 1.8–7.4)
NEUTROPHILS # BLD AUTO: 4.52 K/UL — SIGNIFICANT CHANGE UP (ref 1.8–7.4)
NEUTROPHILS NFR BLD AUTO: 59 % — SIGNIFICANT CHANGE UP (ref 43–77)
NEUTROPHILS NFR BLD AUTO: 59 % — SIGNIFICANT CHANGE UP (ref 43–77)
PHOSPHATE SERPL-MCNC: 3.6 MG/DL — SIGNIFICANT CHANGE UP (ref 2.4–4.7)
PHOSPHATE SERPL-MCNC: 3.6 MG/DL — SIGNIFICANT CHANGE UP (ref 2.4–4.7)
PLATELET # BLD AUTO: 185 K/UL — SIGNIFICANT CHANGE UP (ref 150–400)
PLATELET # BLD AUTO: 185 K/UL — SIGNIFICANT CHANGE UP (ref 150–400)
POTASSIUM SERPL-MCNC: 4.3 MMOL/L — SIGNIFICANT CHANGE UP (ref 3.5–5.3)
POTASSIUM SERPL-MCNC: 4.3 MMOL/L — SIGNIFICANT CHANGE UP (ref 3.5–5.3)
POTASSIUM SERPL-SCNC: 4.3 MMOL/L — SIGNIFICANT CHANGE UP (ref 3.5–5.3)
POTASSIUM SERPL-SCNC: 4.3 MMOL/L — SIGNIFICANT CHANGE UP (ref 3.5–5.3)
PROT SERPL-MCNC: 6.9 G/DL — SIGNIFICANT CHANGE UP (ref 6.6–8.7)
PROT SERPL-MCNC: 6.9 G/DL — SIGNIFICANT CHANGE UP (ref 6.6–8.7)
RBC # BLD: 4.25 M/UL — SIGNIFICANT CHANGE UP (ref 4.2–5.8)
RBC # BLD: 4.25 M/UL — SIGNIFICANT CHANGE UP (ref 4.2–5.8)
RBC # FLD: 13.2 % — SIGNIFICANT CHANGE UP (ref 10.3–14.5)
RBC # FLD: 13.2 % — SIGNIFICANT CHANGE UP (ref 10.3–14.5)
SODIUM SERPL-SCNC: 136 MMOL/L — SIGNIFICANT CHANGE UP (ref 135–145)
SODIUM SERPL-SCNC: 136 MMOL/L — SIGNIFICANT CHANGE UP (ref 135–145)
WBC # BLD: 7.64 K/UL — SIGNIFICANT CHANGE UP (ref 3.8–10.5)
WBC # BLD: 7.64 K/UL — SIGNIFICANT CHANGE UP (ref 3.8–10.5)
WBC # FLD AUTO: 7.64 K/UL — SIGNIFICANT CHANGE UP (ref 3.8–10.5)
WBC # FLD AUTO: 7.64 K/UL — SIGNIFICANT CHANGE UP (ref 3.8–10.5)

## 2024-01-10 PROCEDURE — 36415 COLL VENOUS BLD VENIPUNCTURE: CPT

## 2024-01-10 PROCEDURE — 99233 SBSQ HOSP IP/OBS HIGH 50: CPT

## 2024-01-10 PROCEDURE — 99232 SBSQ HOSP IP/OBS MODERATE 35: CPT

## 2024-01-10 PROCEDURE — 93880 EXTRACRANIAL BILAT STUDY: CPT

## 2024-01-10 PROCEDURE — 86850 RBC ANTIBODY SCREEN: CPT

## 2024-01-10 PROCEDURE — 83735 ASSAY OF MAGNESIUM: CPT

## 2024-01-10 PROCEDURE — 72131 CT LUMBAR SPINE W/O DYE: CPT | Mod: ME

## 2024-01-10 PROCEDURE — 80053 COMPREHEN METABOLIC PANEL: CPT

## 2024-01-10 PROCEDURE — G1004: CPT

## 2024-01-10 PROCEDURE — 84100 ASSAY OF PHOSPHORUS: CPT

## 2024-01-10 PROCEDURE — C8929: CPT

## 2024-01-10 PROCEDURE — 85025 COMPLETE CBC W/AUTO DIFF WBC: CPT

## 2024-01-10 PROCEDURE — 86901 BLOOD TYPING SEROLOGIC RH(D): CPT

## 2024-01-10 PROCEDURE — 93005 ELECTROCARDIOGRAM TRACING: CPT

## 2024-01-10 PROCEDURE — 70450 CT HEAD/BRAIN W/O DYE: CPT

## 2024-01-10 PROCEDURE — 86900 BLOOD TYPING SEROLOGIC ABO: CPT

## 2024-01-10 PROCEDURE — 99222 1ST HOSP IP/OBS MODERATE 55: CPT

## 2024-01-10 PROCEDURE — 85730 THROMBOPLASTIN TIME PARTIAL: CPT

## 2024-01-10 PROCEDURE — 85610 PROTHROMBIN TIME: CPT

## 2024-01-10 PROCEDURE — 72128 CT CHEST SPINE W/O DYE: CPT | Mod: MG

## 2024-01-10 RX ORDER — ACETAMINOPHEN 500 MG
3 TABLET ORAL
Qty: 0 | Refills: 0 | DISCHARGE
Start: 2024-01-10

## 2024-01-10 RX ORDER — METHOCARBAMOL 500 MG/1
1 TABLET, FILM COATED ORAL
Qty: 15 | Refills: 0
Start: 2024-01-10 | End: 2024-01-14

## 2024-01-10 RX ORDER — LIDOCAINE 4 G/100G
2 CREAM TOPICAL
Qty: 14 | Refills: 0
Start: 2024-01-10

## 2024-01-10 RX ADMIN — METHOCARBAMOL 750 MILLIGRAM(S): 500 TABLET, FILM COATED ORAL at 06:02

## 2024-01-10 RX ADMIN — Medication 975 MILLIGRAM(S): at 06:01

## 2024-01-10 RX ADMIN — POLYETHYLENE GLYCOL 3350 17 GRAM(S): 17 POWDER, FOR SOLUTION ORAL at 12:33

## 2024-01-10 RX ADMIN — LISINOPRIL 40 MILLIGRAM(S): 2.5 TABLET ORAL at 06:01

## 2024-01-10 RX ADMIN — Medication 975 MILLIGRAM(S): at 13:32

## 2024-01-10 RX ADMIN — LIDOCAINE 2 PATCH: 4 CREAM TOPICAL at 06:03

## 2024-01-10 RX ADMIN — ESCITALOPRAM OXALATE 10 MILLIGRAM(S): 10 TABLET, FILM COATED ORAL at 12:32

## 2024-01-10 RX ADMIN — METHOCARBAMOL 750 MILLIGRAM(S): 500 TABLET, FILM COATED ORAL at 12:33

## 2024-01-10 RX ADMIN — Medication 975 MILLIGRAM(S): at 07:01

## 2024-01-10 RX ADMIN — Medication 975 MILLIGRAM(S): at 12:32

## 2024-01-10 NOTE — PROGRESS NOTE ADULT - SUBJECTIVE AND OBJECTIVE BOX
HPI/Overnight Events:   Patient seen and examined at bedside this AM. No overnight events. No complaints, pleased to be in a room. Denies fever, chills, nausea, vomiting, chest pain, SOB, dizziness, abd pain or any other concerning symptoms.    Vital Signs Last 24 Hrs  T(C): 36.5 (10 Seng 2024 04:20), Max: 37.2 (09 Jan 2024 15:53)  T(F): 97.7 (10 Seng 2024 04:20), Max: 98.9 (09 Jan 2024 15:53)  HR: 48 (10 Seng 2024 05:56) (48 - 60)  BP: 134/66 (10 Seng 2024 04:20) (112/66 - 134/66)  BP(mean): --  RR: 18 (10 Seng 2024 04:20) (16 - 18)  SpO2: 94% (10 Seng 2024 04:20) (94% - 99%)    Parameters below as of 10 Seng 2024 04:20  Patient On (Oxygen Delivery Method): room air        I&O's Detail      MEDICATIONS  (STANDING):  acetaminophen     Tablet .. 975 milliGRAM(s) Oral every 6 hours  atorvastatin 40 milliGRAM(s) Oral at bedtime  escitalopram 10 milliGRAM(s) Oral daily  lidocaine   4% Patch 2 Patch Transdermal every 24 hours  lisinopril 40 milliGRAM(s) Oral daily  methocarbamol 750 milliGRAM(s) Oral three times a day  polyethylene glycol 3350 17 Gram(s) Oral daily  senna 2 Tablet(s) Oral at bedtime    MEDICATIONS  (PRN):  HYDROmorphone  Injectable 0.2 milliGRAM(s) IV Push every 4 hours PRN breakthrough  magnesium hydroxide Suspension 30 milliLiter(s) Oral daily PRN Constipation  oxyCODONE    IR 5 milliGRAM(s) Oral every 4 hours PRN Severe Pain (7 - 10)  oxyCODONE    IR 2.5 milliGRAM(s) Oral every 4 hours PRN Moderate Pain (4 - 6)      Physical Exam  Constitutional: patient resting comfortably in bed, in no acute distress  HEENT: EOMI, MMM  Respiratory: non-labored breathing on RA  Cardiovascular: RRR  Gastrointestinal: abdomen soft, non-tender, non-distended, no rebound tenderness/guarding  Musculoskeletal: no joint pain, swelling or deformity; no limitation of movement  Vascular: extremities warm and well perfused  Integument: no wounds noted    LABS:                        13.8   7.64  )-----------( 185      ( 10 Seng 2024 02:28 )             41.8     01-10    136  |  100  |  22.6<H>  ----------------------------<  87  4.3   |  20.0<L>  |  0.66    Ca    9.5      10 Seng 2024 02:28  Phos  3.6     01-10  Mg     2.1     01-10    TPro  6.9  /  Alb  4.0  /  TBili  0.9  /  DBili  x   /  AST  26  /  ALT  15  /  AlkPhos  55  01-10    PT/INR - ( 08 Jan 2024 19:40 )   PT: 16.8 sec;   INR: 1.53 ratio         PTT - ( 08 Jan 2024 19:40 )  PTT:49.0 sec    Assessment:  82yo M w/small SAH, L3 compression fracture s/p mechanical fall 4 days PTA. Doing well. No issues. PT recs home PT, patient and family declining IVÁN. TLSO brace seen at bedside.    Plan  - Pain control PRN  - F/u Cardiology consult  - Hold Eliquis for at least 2 weeks  - DVT ppx: SCDs  - Dispo: home w/home PT pending NAIMA Blake MD

## 2024-01-10 NOTE — PROGRESS NOTE ADULT - SUBJECTIVE AND OBJECTIVE BOX
Patient seen and examined . No complaints , waiting to be discharged. Denies sob/chest pain .   Voiding , tolerating diet . Wife and daughter at bed side .      CC : no complaints         MEDICATIONS  (STANDING):  acetaminophen     Tablet .. 975 milliGRAM(s) Oral every 6 hours  atorvastatin 40 milliGRAM(s) Oral at bedtime  escitalopram 10 milliGRAM(s) Oral daily  lidocaine   4% Patch 2 Patch Transdermal every 24 hours  lisinopril 40 milliGRAM(s) Oral daily  methocarbamol 750 milliGRAM(s) Oral three times a day  polyethylene glycol 3350 17 Gram(s) Oral daily  senna 2 Tablet(s) Oral at bedtime    MEDICATIONS  (PRN):  magnesium hydroxide Suspension 30 milliLiter(s) Oral daily PRN Constipation      LABS:                          13.8   7.64  )-----------( 185      ( 10 Seng 2024 02:28 )             41.8     01-10    136  |  100  |  22.6<H>  ----------------------------<  87  4.3   |  20.0<L>  |  0.66    Ca    9.5      10 Seng 2024 02:28  Phos  3.6     01-10  Mg     2.1     01-10    TPro  6.9  /  Alb  4.0  /  TBili  0.9  /  DBili  x   /  AST  26  /  ALT  15  /  AlkPhos  55  01-10    PT/INR - ( 08 Jan 2024 19:40 )   PT: 16.8 sec;   INR: 1.53 ratio         PTT - ( 08 Jan 2024 19:40 )  PTT:49.0 sec      RADIOLOGY & ADDITIONAL TESTS:  < from: CT Head No Cont (01.09.24 @ 18:52) >    ACC: [id number] EXAM:  CT BRAIN   ORDERED BY: ROSALIE RENEE     PROCEDURE DATE:  01/09/2024          < end of copied text >  < from: CT Head No Cont (01.09.24 @ 18:52) >  ______________________  IMPRESSION:  1.  Unchanged left frontal subarachnoid hemorrhage. No new acute   intracranial hemorrhage.  2.  Mild diffuse cerebral volume loss.  < from: 12 Lead ECG (01.09.24 @ 09:47) >    Ventricular Rate 57 BPM    Atrial Rate 57 BPM    QRS Duration 134 ms    Q-T Interval 510 ms    QTC Calculation(Bazett) 496 ms    R Axis -39 degrees    T Axis 17 degrees    Diagnosis Line *** Poor data quality, interpretation may be adversely affected  at fib/junct rhythm  Left axis deviation  Right bundle branch block  Abnormal ECG    < end of copied text >  3.  Severe chronic microvascular ischemic disease.  4.  Sinus disease.    --- End of Report ---      < end of copied text >      < from: TTE W or WO Ultrasound Enhancing Agent (01.09.24 @ 13:00) >    TRANSTHORACIC ECHOCARDIOGRAM REPORT  ________________________________________________________________________________                                      _______       Pt. Name:       IVAN MACKENZIE Study Date:    1/9/2024  MRN:            [id number]        YOB: 1940  Accession #:    [id number]       Age:           83 years  Account#:       [id number]      Gender:        M  Heart Rate:                     Height:        70.00 in (177.80 cm)  Rhythm:                         Weight:        162.00 lb (73.48 kg)  Blood Pressure: 113/66 mmHg     BSA/BMI:       1.91 m² / 23.24 kg/m²  __________________________________________________________________________    < end of copied text >  < from: TTE W or WO Ultrasound Enhancing Agent (01.09.24 @ 13:00) >  CONCLUSIONS:      1. Technically difficult image quality.   2. Left ventricular systolic function is normal with an ejection fraction visually estimated at 60 to 65 %.   3. The right ventricle is not well visualized. probably normal systolic function.   4. The left atrium is mildly dilated.   5. No significant valvular disease.   6. No prior echocardiogram is available for comparison.    ________________________________________________________________________________________    < end of copied text >      REVIEW OF SYSTEMS:    All systems are reviewed and are negative .     Vital Signs Last 24 Hrs  T(C): 36.6 (10 Seng 2024 13:21), Max: 37.2 (09 Jan 2024 15:53)  T(F): 97.9 (10 Seng 2024 13:21), Max: 98.9 (09 Jan 2024 15:53)  HR: 57 (10 Seng 2024 09:58) (48 - 60)  BP: 144/72 (10 Seng 2024 09:58) (112/66 - 144/72)  BP(mean): --  RR: 19 (10 Seng 2024 13:21) (17 - 19)  SpO2: 98% (10 Seng 2024 13:21) (94% - 98%)    Parameters below as of 10 Seng 2024 13:21  Patient On (Oxygen Delivery Method): room air      PHYSICAL EXAM:    GENERAL: NAD, well-groomed, well-developed  HEAD:  Atraumatic, Normocephalic  EYES: EOMI, PERRLA, conjunctiva and sclera clear  NECK: Supple, No JVD, Normal thyroid  NERVOUS SYSTEM:  Alert & Oriented X3, no focal deficit  CHEST/LUNG: CTA b/l ,  no  rales, rhonchi, wheezing, or rubs  HEART: Regular rate and rhythm; No murmurs, rubs, or gallops  ABDOMEN: Soft, Nontender, Nondistended; Bowel sounds present  EXTREMITIES:  2+ Peripheral Pulses, No clubbing, cyanosis, or edema  LYMPH: No lymphadenopathy noted  SKIN: No rashes or lesions   Patient seen and examined . No complaints , waiting to be discharged. Denies sob/chest pain .   Voiding , tolerating diet . Wife and daughter at bed side .      CC : no complaints         MEDICATIONS  (STANDING):  acetaminophen     Tablet .. 975 milliGRAM(s) Oral every 6 hours  atorvastatin 40 milliGRAM(s) Oral at bedtime  escitalopram 10 milliGRAM(s) Oral daily  lidocaine   4% Patch 2 Patch Transdermal every 24 hours  lisinopril 40 milliGRAM(s) Oral daily  methocarbamol 750 milliGRAM(s) Oral three times a day  polyethylene glycol 3350 17 Gram(s) Oral daily  senna 2 Tablet(s) Oral at bedtime    MEDICATIONS  (PRN):  magnesium hydroxide Suspension 30 milliLiter(s) Oral daily PRN Constipation      LABS:                          13.8   7.64  )-----------( 185      ( 10 Seng 2024 02:28 )             41.8     01-10    136  |  100  |  22.6<H>  ----------------------------<  87  4.3   |  20.0<L>  |  0.66    Ca    9.5      10 Seng 2024 02:28  Phos  3.6     01-10  Mg     2.1     01-10    TPro  6.9  /  Alb  4.0  /  TBili  0.9  /  DBili  x   /  AST  26  /  ALT  15  /  AlkPhos  55  01-10    PT/INR - ( 08 Jan 2024 19:40 )   PT: 16.8 sec;   INR: 1.53 ratio         PTT - ( 08 Jan 2024 19:40 )  PTT:49.0 sec      RADIOLOGY & ADDITIONAL TESTS:  < from: CT Head No Cont (01.09.24 @ 18:52) >    ACC: [id number] EXAM:  CT BRAIN   ORDERED BY: ROSALIE RENEE     PROCEDURE DATE:  01/09/2024          < end of copied text >  < from: CT Head No Cont (01.09.24 @ 18:52) >  ______________________  IMPRESSION:  1.  Unchanged left frontal subarachnoid hemorrhage. No new acute   intracranial hemorrhage.  2.  Mild diffuse cerebral volume loss.  < from: 12 Lead ECG (01.09.24 @ 09:47) >    Ventricular Rate 57 BPM    Atrial Rate 57 BPM    QRS Duration 134 ms    Q-T Interval 510 ms    QTC Calculation(Bazett) 496 ms    R Axis -39 degrees    T Axis 17 degrees    Diagnosis Line *** Poor data quality, interpretation may be adversely affected  at fib/junct rhythm  Left axis deviation  Right bundle branch block  Abnormal ECG    < end of copied text >  3.  Severe chronic microvascular ischemic disease.  4.  Sinus disease.    --- End of Report ---      < end of copied text >      < from: TTE W or WO Ultrasound Enhancing Agent (01.09.24 @ 13:00) >    TRANSTHORACIC ECHOCARDIOGRAM REPORT  ________________________________________________________________________________                                      _______       Pt. Name:       IVAN MACKENZIE Study Date:    1/9/2024  MRN:            [id number]        YOB: 1940  Accession #:    [id number]       Age:           83 years  Account#:       [id number]      Gender:        M  Heart Rate:                     Height:        70.00 in (177.80 cm)  Rhythm:                         Weight:        162.00 lb (73.48 kg)  Blood Pressure: 113/66 mmHg     BSA/BMI:       1.91 m² / 23.24 kg/m²  __________________________________________________________________________    < end of copied text >  < from: TTE W or WO Ultrasound Enhancing Agent (01.09.24 @ 13:00) >  CONCLUSIONS:      1. Technically difficult image quality.   2. Left ventricular systolic function is normal with an ejection fraction visually estimated at 60 to 65 %.   3. The right ventricle is not well visualized. probably normal systolic function.   4. The left atrium is mildly dilated.   5. No significant valvular disease.   6. No prior echocardiogram is available for comparison.    ________________________________________________________________________________________    < end of copied text >      REVIEW OF SYSTEMS:    All systems are reviewed and are negative .     Vital Signs Last 24 Hrs  T(C): 36.6 (10 Seng 2024 13:21), Max: 37.2 (09 Jan 2024 15:53)  T(F): 97.9 (10 Seng 2024 13:21), Max: 98.9 (09 Jan 2024 15:53)  HR: 57 (10 Seng 2024 09:58) (48 - 60)  BP: 144/72 (10 Seng 2024 09:58) (112/66 - 144/72)  BP(mean): --  RR: 19 (10 Seng 2024 13:21) (17 - 19)  SpO2: 98% (10 Seng 2024 13:21) (94% - 98%)    Parameters below as of 10 Seng 2024 13:21  Patient On (Oxygen Delivery Method): room air      PHYSICAL EXAM:    GENERAL: NAD, well-groomed, well-developed  HEAD:  Atraumatic, Normocephalic  EYES: EOMI, PERRLA, conjunctiva and sclera clear  NECK: Supple, No JVD, Normal thyroid  NERVOUS SYSTEM:  Alert & Oriented X3, no focal deficit  CHEST/LUNG: CTA b/l ,  no  rales, rhonchi, wheezing, or rubs  HEART: Regular rate and rhythm; No murmurs, rubs, or gallops  ABDOMEN: Soft, Nontender, Nondistended; Bowel sounds present  EXTREMITIES:  2+ Peripheral Pulses, No clubbing, cyanosis, or edema  LYMPH: No lymphadenopathy noted  SKIN: No rashes or lesions

## 2024-01-10 NOTE — PROGRESS NOTE ADULT - ATTENDING COMMENTS
83-year-old male s/p fall with:     #SAD and L3 compression fracture   - Q4 neurochecks   - 24 hr stability scan stable   - Holding antiplatelet/anticoagulant   - TLSO brace  - PT eval: home PT   - pain control   - appreciate geriatric consult recs   - discharge planning home today     #HTN, HLD, depressions: c/w home meds   #Syncope   - ECHO obtained   - Carotids duplex obtained   - No orthostatic hypotension   - F/u outpatient     PPX:  - DVT ppx: SCD  - GI ppx: none   - IS / activity as tolerated   - Bowel regimen  - Aspiration precautions  - Delirium precautions
83-year-old male s/p fall with:     #SAD and L3 compression fracture   - Q4 neurochecks   - 24 hr stability scan   - Holding antiplatelet/anticoagulant   - TLSO brace  - PT eval   - pain control   - geriatric consultation     #HTN, HLD, depressions: resume home meds   #Syncope   - ECHO  - Carotids duplex     PPX:  - DVT ppx: SCD  - GI ppx: none   - IS / activity as tolerated   - Bowel regimen  - Aspiration precautions  - Delirium precautions

## 2024-01-10 NOTE — CONSULT NOTE ADULT - PROBLEM SELECTOR RECOMMENDATION 2
- Patient with remote history of PCI in 2018.   - Aspirin on hold for SAH, restart when cleared by Neurosurgery.   - Continue atorvastatin and lisinopril.

## 2024-01-10 NOTE — CONSULT NOTE ADULT - SUBJECTIVE AND OBJECTIVE BOX
[facility]                                              CARDIOLOGY AT [address], [place] [address]                                             Telephone: [phone]. Fax:[phone]                                                       CARDIOLOGY CONSULTATION NOTE                                                                                             History obtained by: Patient and medical record  Community Cardiologist: [facility]   obtained: Yes [  ] No [ X ]  Reason for Consultation: Atrial Fibrillation   Available out pt records reviewed: Yes [  ] No [  ]    Chief complaint:    Patient is a 83y old  Male who presents with a chief complaint of SAH   L3 compression fx (10 Seng 2024 08:52)      HPI: Patient is an 82 y/o M with a PMHx of atrial fibrillation (on Eliquis), CAD s/p PCI (2018), HTN, HLD, prostate surgery, right TKR, and hernia surgery who presented to [facility] for a SAH s/p fall transferred to [facility] for further management. Patient states that for the last few months he has been experiencing dizzy spells and falls. Patient states that he has fallen at least 3 times in the last year. This past time the patient states that he tripped and struck his head on his dresser. Patient was also complaining of lower back pain as well, and went to see his Cardiologist as an outpatient. Patient's Cardiologist ordered a CT head that showed a left frontal SAH, and he was referred to [facility]. Patient was then transferred to [facility] for further management. SAH at this time is stable. Patient denies any fevers, chills, CP, SOB, syncope, abdominal pain, N/V/D, or vision changes.       CARDIAC TESTING   ECHO:  < from: TTE W or WO Ultrasound Enhancing Agent (01.09.24 @ 13:00) >  CONCLUSIONS:      1. Technically difficult image quality.   2. Left ventricular systolic function is normal with an ejection fraction visually estimated at 60 to 65 %.   3. The right ventricle is not well visualized. probably normal systolic function.   4. The left atrium is mildly dilated.   5. No significant valvular disease.   6. No prior echocardiogram is available for comparison.    < end of copied text >    STRESS:    CATH:     ELECTROPHYSIOLOGY:     PAST MEDICAL HISTORY      PAST SURGICAL HISTORY      SOCIAL HISTORY:  Denies smoking/alcohol/drugs      FAMILY HISTORY:    Family History of Cardiovascular Disease:  Yes [  ] No [  ]  Coronary Artery Disease in first degree relative: Yes [  ] No [  ]  Sudden Cardiac Death in First degree relative: Yes [  ] No [  ]    HOME MEDICATIONS:  azelastine 137 mcg/inh (0.1%) nasal spray: 2 spray(s) intranasally once a day (09 Jan 2024 00:58)  escitalopram 10 mg oral tablet: 1 tab(s) orally once a day (at bedtime) (09 Jan 2024 00:59)  lisinopril 40 mg oral tablet: 1 tab(s) orally once a day (09 Jan 2024 00:59)  simvastatin 40 mg oral tablet: 1 tab(s) orally once a day (09 Jan 2024 00:59)      CURRENT CARDIAC MEDICATIONS:  lisinopril 40 milliGRAM(s) Oral daily      CURRENT OTHER MEDICATIONS:  acetaminophen     Tablet .. 975 milliGRAM(s) Oral every 6 hours  escitalopram 10 milliGRAM(s) Oral daily  HYDROmorphone  Injectable 0.2 milliGRAM(s) IV Push every 4 hours PRN breakthrough  methocarbamol 750 milliGRAM(s) Oral three times a day  oxyCODONE    IR 5 milliGRAM(s) Oral every 4 hours PRN Severe Pain (7 - 10)  oxyCODONE    IR 2.5 milliGRAM(s) Oral every 4 hours PRN Moderate Pain (4 - 6)  magnesium hydroxide Suspension 30 milliLiter(s) Oral daily PRN Constipation  polyethylene glycol 3350 17 Gram(s) Oral daily  senna 2 Tablet(s) Oral at bedtime  atorvastatin 40 milliGRAM(s) Oral at bedtime  lidocaine   4% Patch 2 Patch Transdermal every 24 hours      ALLERGIES:   No Known Allergies      REVIEW OF SYMPTOMS:   CONSTITUTIONAL: No fever, no chills, no weight loss, no weight gain, no fatigue   ENMT:  No vertigo; No sinus or throat pain  NECK: No pain or stiffness  CARDIOVASCULAR: AS PER HPI  RESPIRATORY: no Shortness of breath, no cough, no wheezing  : No dysuria, no hematuria   GI: No dark color stool, no nausea, no diarrhea, no constipation, no abdominal pain   NEURO: AS PER HPI  MUSCULOSKELETAL: No joint pain or swelling; No muscle, back, or extremity pain  PSYCH: No agitation, no anxiety.    ALL OTHER REVIEW OF SYSTEMS ARE NEGATIVE.    VITAL SIGNS:  T(C): 36.5 (01-10-24 @ 04:20), Max: 37.2 (01-09-24 @ 15:53)  T(F): 97.7 (01-10-24 @ 04:20), Max: 98.9 (01-09-24 @ 15:53)  HR: 48 (01-10-24 @ 05:56) (48 - 60)  BP: 134/66 (01-10-24 @ 04:20) (112/66 - 134/66)  RR: 18 (01-10-24 @ 04:20) (16 - 18)  SpO2: 94% (01-10-24 @ 04:20) (94% - 99%)    INTAKE AND OUTPUT:       PHYSICAL EXAM:  Constitutional: Comfortable . No acute distress.   HEENT: Atraumatic and normocephalic , neck is supple . no JVD. No carotid bruit.  CNS: A&Ox3. No focal deficits.   Respiratory: CTAB, unlabored   Cardiovascular: Bradycardic s1 s2. No murmur. No rubs or gallop.  Gastrointestinal: Soft, non-tender. +Bowel sounds.   Extremities: 2+ Peripheral Pulses, No clubbing, cyanosis, or edema  Psychiatric: Calm . no agitation.   Skin: Warm and dry, no ulcers on extremities     LABS:                            13.8   7.64  )-----------( 185      ( 10 Seng 2024 02:28 )             41.8     01-10    136  |  100  |  22.6<H>  ----------------------------<  87  4.3   |  20.0<L>  |  0.66    Ca    9.5      10 Seng 2024 02:28  Phos  3.6     01-10  Mg     2.1     01-10    TPro  6.9  /  Alb  4.0  /  TBili  0.9  /  DBili  x   /  AST  26  /  ALT  15  /  AlkPhos  55  01-10    PT/INR - ( 08 Jan 2024 19:40 )   PT: 16.8 sec;   INR: 1.53 ratio         PTT - ( 08 Jan 2024 19:40 )  PTT:49.0 sec  Urinalysis Basic - ( 10 Seng 2024 02:28 )    Color: x / Appearance: x / SG: x / pH: x  Gluc: 87 mg/dL / Ketone: x  / Bili: x / Urobili: x   Blood: x / Protein: x / Nitrite: x   Leuk Esterase: x / RBC: x / WBC x   Sq Epi: x / Non Sq Epi: x / Bacteria: x              INTERPRETATION OF TELEMETRY: Atrial flutter with slow ventricular response    ECG: Atrial flutter with slow ventricular response, RBBB  Prior ECG: Yes [  ] No [  ]    RADIOLOGY & ADDITIONAL STUDIES:    X-ray:    CT scan:   < from: CT Head No Cont (01.09.24 @ 18:52) >  IMPRESSION:  1.  Unchanged left frontal subarachnoid hemorrhage. No new acute   intracranial hemorrhage.  2.  Mild diffuse cerebral volume loss.  3.  Severe chronic microvascular ischemic disease.  4.  Sinus disease.    < end of copied text >    MRI:   US:                                                  [facility]                                              CARDIOLOGY AT [address], [place] [address]                                             Telephone: [phone]. Fax:[phone]                                                       CARDIOLOGY CONSULTATION NOTE                                                                                             History obtained by: Patient and medical record  Community Cardiologist: [facility]   obtained: Yes [  ] No [ X ]  Reason for Consultation: Atrial Fibrillation   Available out pt records reviewed: Yes [  ] No [  ]    Chief complaint:    Patient is a 83y old  Male who presents with a chief complaint of SAH   L3 compression fx (10 Seng 2024 08:52)      HPI: Patient is an 82 y/o M with a PMHx of atrial fibrillation (on Eliquis), CAD s/p PCI (2018), HTN, HLD, prostate surgery, right TKR, and hernia surgery who presented to [facility] for a SAH s/p fall transferred to [facility] for further management. Patient states that for the last few months he has been experiencing dizzy spells and falls. Patient states that he has fallen at least 3 times in the last year. This past time the patient states that he tripped and struck his head on his dresser. Patient was also complaining of lower back pain as well, and went to see his Cardiologist as an outpatient. Patient's Cardiologist ordered a CT head that showed a left frontal SAH, and he was referred to [facility]. Patient was then transferred to [facility] for further management. SAH at this time is stable. Patient denies any fevers, chills, CP, SOB, syncope, abdominal pain, N/V/D, or vision changes.       CARDIAC TESTING   ECHO:  < from: TTE W or WO Ultrasound Enhancing Agent (01.09.24 @ 13:00) >  CONCLUSIONS:      1. Technically difficult image quality.   2. Left ventricular systolic function is normal with an ejection fraction visually estimated at 60 to 65 %.   3. The right ventricle is not well visualized. probably normal systolic function.   4. The left atrium is mildly dilated.   5. No significant valvular disease.   6. No prior echocardiogram is available for comparison.    < end of copied text >    STRESS:    CATH:     ELECTROPHYSIOLOGY:     PAST MEDICAL HISTORY      PAST SURGICAL HISTORY      SOCIAL HISTORY:  Denies smoking/alcohol/drugs      FAMILY HISTORY:    Family History of Cardiovascular Disease:  Yes [  ] No [  ]  Coronary Artery Disease in first degree relative: Yes [  ] No [  ]  Sudden Cardiac Death in First degree relative: Yes [  ] No [  ]    HOME MEDICATIONS:  azelastine 137 mcg/inh (0.1%) nasal spray: 2 spray(s) intranasally once a day (09 Jan 2024 00:58)  escitalopram 10 mg oral tablet: 1 tab(s) orally once a day (at bedtime) (09 Jan 2024 00:59)  lisinopril 40 mg oral tablet: 1 tab(s) orally once a day (09 Jan 2024 00:59)  simvastatin 40 mg oral tablet: 1 tab(s) orally once a day (09 Jan 2024 00:59)      CURRENT CARDIAC MEDICATIONS:  lisinopril 40 milliGRAM(s) Oral daily      CURRENT OTHER MEDICATIONS:  acetaminophen     Tablet .. 975 milliGRAM(s) Oral every 6 hours  escitalopram 10 milliGRAM(s) Oral daily  HYDROmorphone  Injectable 0.2 milliGRAM(s) IV Push every 4 hours PRN breakthrough  methocarbamol 750 milliGRAM(s) Oral three times a day  oxyCODONE    IR 5 milliGRAM(s) Oral every 4 hours PRN Severe Pain (7 - 10)  oxyCODONE    IR 2.5 milliGRAM(s) Oral every 4 hours PRN Moderate Pain (4 - 6)  magnesium hydroxide Suspension 30 milliLiter(s) Oral daily PRN Constipation  polyethylene glycol 3350 17 Gram(s) Oral daily  senna 2 Tablet(s) Oral at bedtime  atorvastatin 40 milliGRAM(s) Oral at bedtime  lidocaine   4% Patch 2 Patch Transdermal every 24 hours      ALLERGIES:   No Known Allergies      REVIEW OF SYMPTOMS:   CONSTITUTIONAL: No fever, no chills, no weight loss, no weight gain, no fatigue   ENMT:  No vertigo; No sinus or throat pain  NECK: No pain or stiffness  CARDIOVASCULAR: AS PER HPI  RESPIRATORY: no Shortness of breath, no cough, no wheezing  : No dysuria, no hematuria   GI: No dark color stool, no nausea, no diarrhea, no constipation, no abdominal pain   NEURO: AS PER HPI  MUSCULOSKELETAL: No joint pain or swelling; No muscle, back, or extremity pain  PSYCH: No agitation, no anxiety.    ALL OTHER REVIEW OF SYSTEMS ARE NEGATIVE.    VITAL SIGNS:  T(C): 36.5 (01-10-24 @ 04:20), Max: 37.2 (01-09-24 @ 15:53)  T(F): 97.7 (01-10-24 @ 04:20), Max: 98.9 (01-09-24 @ 15:53)  HR: 48 (01-10-24 @ 05:56) (48 - 60)  BP: 134/66 (01-10-24 @ 04:20) (112/66 - 134/66)  RR: 18 (01-10-24 @ 04:20) (16 - 18)  SpO2: 94% (01-10-24 @ 04:20) (94% - 99%)    INTAKE AND OUTPUT:       PHYSICAL EXAM:  Constitutional: Comfortable . No acute distress.   HEENT: Atraumatic and normocephalic , neck is supple . no JVD. No carotid bruit.  CNS: A&Ox3. No focal deficits.   Respiratory: CTAB, unlabored   Cardiovascular: Bradycardic s1 s2. No murmur. No rubs or gallop.  Gastrointestinal: Soft, non-tender. +Bowel sounds.   Extremities: 2+ Peripheral Pulses, No clubbing, cyanosis, or edema  Psychiatric: Calm . no agitation.   Skin: Warm and dry, no ulcers on extremities     LABS:                            13.8   7.64  )-----------( 185      ( 10 Seng 2024 02:28 )             41.8     01-10    136  |  100  |  22.6<H>  ----------------------------<  87  4.3   |  20.0<L>  |  0.66    Ca    9.5      10 Seng 2024 02:28  Phos  3.6     01-10  Mg     2.1     01-10    TPro  6.9  /  Alb  4.0  /  TBili  0.9  /  DBili  x   /  AST  26  /  ALT  15  /  AlkPhos  55  01-10    PT/INR - ( 08 Jan 2024 19:40 )   PT: 16.8 sec;   INR: 1.53 ratio         PTT - ( 08 Jan 2024 19:40 )  PTT:49.0 sec  Urinalysis Basic - ( 10 Seng 2024 02:28 )    Color: x / Appearance: x / SG: x / pH: x  Gluc: 87 mg/dL / Ketone: x  / Bili: x / Urobili: x   Blood: x / Protein: x / Nitrite: x   Leuk Esterase: x / RBC: x / WBC x   Sq Epi: x / Non Sq Epi: x / Bacteria: x              INTERPRETATION OF TELEMETRY: Atrial flutter with slow ventricular response    ECG: Atrial flutter with slow ventricular response, RBBB  Prior ECG: Yes [  ] No [  ]    RADIOLOGY & ADDITIONAL STUDIES:    X-ray:    CT scan:   < from: CT Head No Cont (01.09.24 @ 18:52) >  IMPRESSION:  1.  Unchanged left frontal subarachnoid hemorrhage. No new acute   intracranial hemorrhage.  2.  Mild diffuse cerebral volume loss.  3.  Severe chronic microvascular ischemic disease.  4.  Sinus disease.    < end of copied text >    MRI:   US:                                                  [facility]                                              CARDIOLOGY AT [address], [place] [address]                                             Telephone: [phone]. Fax:[phone]                                                       CARDIOLOGY CONSULTATION NOTE                                                                                             History obtained by: Patient and medical record  Community Cardiologist: Dr. Vergara   obtained: Yes [  ] No [ X ]  Reason for Consultation: Atrial Fibrillation   Available out pt records reviewed: Yes [  ] No [  ]    Chief complaint:    Patient is a 83y old  Male who presents with a chief complaint of SAH   L3 compression fx (10 Seng 2024 08:52)      HPI: Patient is an 82 y/o M with a PMHx of atrial fibrillation (on Eliquis), CAD s/p PCI (2018), HTN, HLD, prostate surgery, right TKR, and hernia surgery who presented to [facility] [place]H s/p fall transferred to [facility] for further management. Patient states that for the last few months he has been experiencing dizzy spells and falls. Patient states that he has fallen at least 3 times in the last year. This past time the patient states that he tripped and struck his head on his dresser. Patient was also complaining of lower back pain as well, and went to see his Cardiologist as an outpatient. Patient's Cardiologist ordered a CT head that showed a left frontal SAH, and he was referred to [facility]. Patient was then transferred to [facility] for further management. SAH at this time is stable. Patient denies any fevers, chills, CP, SOB, syncope, abdominal pain, N/V/D, or vision changes.       CARDIAC TESTING   ECHO:  < from: TTE W or WO Ultrasound Enhancing Agent (01.09.24 @ 13:00) >  CONCLUSIONS:      1. Technically difficult image quality.   2. Left ventricular systolic function is normal with an ejection fraction visually estimated at 60 to 65 %.   3. The right ventricle is not well visualized. probably normal systolic function.   4. The left atrium is mildly dilated.   5. No significant valvular disease.   6. No prior echocardiogram is available for comparison.    < end of copied text >    STRESS:    CATH:     ELECTROPHYSIOLOGY:     PAST MEDICAL HISTORY      PAST SURGICAL HISTORY      SOCIAL HISTORY:  Denies smoking/alcohol/drugs      FAMILY HISTORY:    Family History of Cardiovascular Disease:  Yes [  ] No [  ]  Coronary Artery Disease in first degree relative: Yes [  ] No [  ]  Sudden Cardiac Death in First degree relative: Yes [  ] No [  ]    HOME MEDICATIONS:  azelastine 137 mcg/inh (0.1%) nasal spray: 2 spray(s) intranasally once a day (09 Jan 2024 00:58)  escitalopram 10 mg oral tablet: 1 tab(s) orally once a day (at bedtime) (09 Jan 2024 00:59)  lisinopril 40 mg oral tablet: 1 tab(s) orally once a day (09 Jan 2024 00:59)  simvastatin 40 mg oral tablet: 1 tab(s) orally once a day (09 Jan 2024 00:59)      CURRENT CARDIAC MEDICATIONS:  lisinopril 40 milliGRAM(s) Oral daily      CURRENT OTHER MEDICATIONS:  acetaminophen     Tablet .. 975 milliGRAM(s) Oral every 6 hours  escitalopram 10 milliGRAM(s) Oral daily  HYDROmorphone  Injectable 0.2 milliGRAM(s) IV Push every 4 hours PRN breakthrough  methocarbamol 750 milliGRAM(s) Oral three times a day  oxyCODONE    IR 5 milliGRAM(s) Oral every 4 hours PRN Severe Pain (7 - 10)  oxyCODONE    IR 2.5 milliGRAM(s) Oral every 4 hours PRN Moderate Pain (4 - 6)  magnesium hydroxide Suspension 30 milliLiter(s) Oral daily PRN Constipation  polyethylene glycol 3350 17 Gram(s) Oral daily  senna 2 Tablet(s) Oral at bedtime  atorvastatin 40 milliGRAM(s) Oral at bedtime  lidocaine   4% Patch 2 Patch Transdermal every 24 hours      ALLERGIES:   No Known Allergies      REVIEW OF SYMPTOMS:   CONSTITUTIONAL: No fever, no chills, no weight loss, no weight gain, no fatigue   ENMT:  No vertigo; No sinus or throat pain  NECK: No pain or stiffness  CARDIOVASCULAR: AS PER HPI  RESPIRATORY: no Shortness of breath, no cough, no wheezing  : No dysuria, no hematuria   GI: No dark color stool, no nausea, no diarrhea, no constipation, no abdominal pain   NEURO: AS PER HPI  MUSCULOSKELETAL: No joint pain or swelling; No muscle, back, or extremity pain  PSYCH: No agitation, no anxiety.    ALL OTHER REVIEW OF SYSTEMS ARE NEGATIVE.    VITAL SIGNS:  T(C): 36.5 (01-10-24 @ 04:20), Max: 37.2 (01-09-24 @ 15:53)  T(F): 97.7 (01-10-24 @ 04:20), Max: 98.9 (01-09-24 @ 15:53)  HR: 48 (01-10-24 @ 05:56) (48 - 60)  BP: 134/66 (01-10-24 @ 04:20) (112/66 - 134/66)  RR: 18 (01-10-24 @ 04:20) (16 - 18)  SpO2: 94% (01-10-24 @ 04:20) (94% - 99%)    INTAKE AND OUTPUT:       PHYSICAL EXAM:  Constitutional: Comfortable . No acute distress.   HEENT: Atraumatic and normocephalic , neck is supple . no JVD. No carotid bruit.  CNS: A&Ox3. No focal deficits.   Respiratory: CTAB, unlabored   Cardiovascular: Bradycardic s1 s2. No murmur. No rubs or gallop.  Gastrointestinal: Soft, non-tender. +Bowel sounds.   Extremities: 2+ Peripheral Pulses, No clubbing, cyanosis, or edema  Psychiatric: Calm . no agitation.   Skin: Warm and dry, no ulcers on extremities     LABS:                            13.8   7.64  )-----------( 185      ( 10 Seng 2024 02:28 )             41.8     01-10    136  |  100  |  22.6<H>  ----------------------------<  87  4.3   |  20.0<L>  |  0.66    Ca    9.5      10 Seng 2024 02:28  Phos  3.6     01-10  Mg     2.1     01-10    TPro  6.9  /  Alb  4.0  /  TBili  0.9  /  DBili  x   /  AST  26  /  ALT  15  /  AlkPhos  55  01-10    PT/INR - ( 08 Jan 2024 19:40 )   PT: 16.8 sec;   INR: 1.53 ratio         PTT - ( 08 Jan 2024 19:40 )  PTT:49.0 sec  Urinalysis Basic - ( 10 Seng 2024 02:28 )    Color: x / Appearance: x / SG: x / pH: x  Gluc: 87 mg/dL / Ketone: x  / Bili: x / Urobili: x   Blood: x / Protein: x / Nitrite: x   Leuk Esterase: x / RBC: x / WBC x   Sq Epi: x / Non Sq Epi: x / Bacteria: x              INTERPRETATION OF TELEMETRY: Atrial flutter with slow ventricular response    ECG: Atrial flutter with slow ventricular response, RBBB  Prior ECG: Yes [  ] No [  ]    RADIOLOGY & ADDITIONAL STUDIES:    X-ray:    CT scan:   < from: CT Head No Cont (01.09.24 @ 18:52) >  IMPRESSION:  1.  Unchanged left frontal subarachnoid hemorrhage. No new acute   intracranial hemorrhage.  2.  Mild diffuse cerebral volume loss.  3.  Severe chronic microvascular ischemic disease.  4.  Sinus disease.    < end of copied text >    MRI:   US:                                                  [facility]                                              CARDIOLOGY AT [address], [place] [address]                                             Telephone: [phone]. Fax:[phone]                                                       CARDIOLOGY CONSULTATION NOTE                                                                                             History obtained by: Patient and medical record  Community Cardiologist: Dr. Vergara   obtained: Yes [  ] No [ X ]  Reason for Consultation: Atrial Fibrillation   Available out pt records reviewed: Yes [  ] No [  ]    Chief complaint:    Patient is a 83y old  Male who presents with a chief complaint of SAH   L3 compression fx (10 Seng 2024 08:52)      HPI: Patient is an 84 y/o M with a PMHx of atrial fibrillation (on Eliquis), CAD s/p PCI (2018), HTN, HLD, prostate surgery, right TKR, and hernia surgery who [place] [facility] [place]/p [place] transferred to [facility] for further management. Patient states that for the last few months he has been experiencing dizzy spells and falls. Patient states that he has fallen at least 3 times in the last year. This past time the patient states that he tripped and struck his head on his dresser. Patient was also complaining of lower back pain as well, and went to see his Cardiologist as an outpatient. Patient's Cardiologist ordered a CT head that showed a left frontal SAH, and he was referred to [facility]. Patient was then transferred to [facility] for further management. SAH at this time is stable. Patient denies any fevers, chills, CP, SOB, syncope, abdominal pain, N/V/D, or vision changes.       CARDIAC TESTING   ECHO:  < from: TTE W or WO Ultrasound Enhancing Agent (01.09.24 @ 13:00) >  CONCLUSIONS:      1. Technically difficult image quality.   2. Left ventricular systolic function is normal with an ejection fraction visually estimated at 60 to 65 %.   3. The right ventricle is not well visualized. probably normal systolic function.   4. The left atrium is mildly dilated.   5. No significant valvular disease.   6. No prior echocardiogram is available for comparison.    < end of copied text >    STRESS:    CATH:     ELECTROPHYSIOLOGY:     PAST MEDICAL HISTORY      PAST SURGICAL HISTORY      SOCIAL HISTORY:  Denies smoking/alcohol/drugs      FAMILY HISTORY:    Family History of Cardiovascular Disease:  Yes [  ] No [  ]  Coronary Artery Disease in first degree relative: Yes [  ] No [  ]  Sudden Cardiac Death in First degree relative: Yes [  ] No [  ]    HOME MEDICATIONS:  azelastine 137 mcg/inh (0.1%) nasal spray: 2 spray(s) intranasally once a day (09 Jan 2024 00:58)  escitalopram 10 mg oral tablet: 1 tab(s) orally once a day (at bedtime) (09 Jan 2024 00:59)  lisinopril 40 mg oral tablet: 1 tab(s) orally once a day (09 Jan 2024 00:59)  simvastatin 40 mg oral tablet: 1 tab(s) orally once a day (09 Jan 2024 00:59)      CURRENT CARDIAC MEDICATIONS:  lisinopril 40 milliGRAM(s) Oral daily      CURRENT OTHER MEDICATIONS:  acetaminophen     Tablet .. 975 milliGRAM(s) Oral every 6 hours  escitalopram 10 milliGRAM(s) Oral daily  HYDROmorphone  Injectable 0.2 milliGRAM(s) IV Push every 4 hours PRN breakthrough  methocarbamol 750 milliGRAM(s) Oral three times a day  oxyCODONE    IR 5 milliGRAM(s) Oral every 4 hours PRN Severe Pain (7 - 10)  oxyCODONE    IR 2.5 milliGRAM(s) Oral every 4 hours PRN Moderate Pain (4 - 6)  magnesium hydroxide Suspension 30 milliLiter(s) Oral daily PRN Constipation  polyethylene glycol 3350 17 Gram(s) Oral daily  senna 2 Tablet(s) Oral at bedtime  atorvastatin 40 milliGRAM(s) Oral at bedtime  lidocaine   4% Patch 2 Patch Transdermal every 24 hours      ALLERGIES:   No Known Allergies      REVIEW OF SYMPTOMS:   CONSTITUTIONAL: No fever, no chills, no weight loss, no weight gain, no fatigue   ENMT:  No vertigo; No sinus or throat pain  NECK: No pain or stiffness  CARDIOVASCULAR: AS PER HPI  RESPIRATORY: no Shortness of breath, no cough, no wheezing  : No dysuria, no hematuria   GI: No dark color stool, no nausea, no diarrhea, no constipation, no abdominal pain   NEURO: AS PER HPI  MUSCULOSKELETAL: No joint pain or swelling; No muscle, back, or extremity pain  PSYCH: No agitation, no anxiety.    ALL OTHER REVIEW OF SYSTEMS ARE NEGATIVE.    VITAL SIGNS:  T(C): 36.5 (01-10-24 @ 04:20), Max: 37.2 (01-09-24 @ 15:53)  T(F): 97.7 (01-10-24 @ 04:20), Max: 98.9 (01-09-24 @ 15:53)  HR: 48 (01-10-24 @ 05:56) (48 - 60)  BP: 134/66 (01-10-24 @ 04:20) (112/66 - 134/66)  RR: 18 (01-10-24 @ 04:20) (16 - 18)  SpO2: 94% (01-10-24 @ 04:20) (94% - 99%)    INTAKE AND OUTPUT:       PHYSICAL EXAM:  Constitutional: Comfortable . No acute distress.   HEENT: Atraumatic and normocephalic , neck is supple . no JVD. No carotid bruit.  CNS: A&Ox3. No focal deficits.   Respiratory: CTAB, unlabored   Cardiovascular: Bradycardic s1 s2. No murmur. No rubs or gallop.  Gastrointestinal: Soft, non-tender. +Bowel sounds.   Extremities: 2+ Peripheral Pulses, No clubbing, cyanosis, or edema  Psychiatric: Calm . no agitation.   Skin: Warm and dry, no ulcers on extremities     LABS:                            13.8   7.64  )-----------( 185      ( 10 Seng 2024 02:28 )             41.8     01-10    136  |  100  |  22.6<H>  ----------------------------<  87  4.3   |  20.0<L>  |  0.66    Ca    9.5      10 Seng 2024 02:28  Phos  3.6     01-10  Mg     2.1     01-10    TPro  6.9  /  Alb  4.0  /  TBili  0.9  /  DBili  x   /  AST  26  /  ALT  15  /  AlkPhos  55  01-10    PT/INR - ( 08 Jan 2024 19:40 )   PT: 16.8 sec;   INR: 1.53 ratio         PTT - ( 08 Jan 2024 19:40 )  PTT:49.0 sec  Urinalysis Basic - ( 10 Seng 2024 02:28 )    Color: x / Appearance: x / SG: x / pH: x  Gluc: 87 mg/dL / Ketone: x  / Bili: x / Urobili: x   Blood: x / Protein: x / Nitrite: x   Leuk Esterase: x / RBC: x / WBC x   Sq Epi: x / Non Sq Epi: x / Bacteria: x              INTERPRETATION OF TELEMETRY: Atrial flutter with slow ventricular response    ECG: Atrial flutter with slow ventricular response, RBBB  Prior ECG: Yes [  ] No [  ]    RADIOLOGY & ADDITIONAL STUDIES:    X-ray:    CT scan:   < from: CT Head No Cont (01.09.24 @ 18:52) >  IMPRESSION:  1.  Unchanged left frontal subarachnoid hemorrhage. No new acute   intracranial hemorrhage.  2.  Mild diffuse cerebral volume loss.  3.  Severe chronic microvascular ischemic disease.  4.  Sinus disease.    < end of copied text >    MRI:   US:

## 2024-01-10 NOTE — DISCHARGE NOTE NURSING/CASE MANAGEMENT/SOCIAL WORK - NSDCPEFALRISK_GEN_ALL_CORE
For information on Fall & Injury Prevention, visit: [email or web address] OR  [email or web address] OR  https://www.cdc.gov/steadi/patient.html For information on Fall & Injury Prevention, visit: [email or web address]://www.[email or web address]/[email or web address]-health-[email or web address]-mobility OR  https://www.[email or web address]/[email or web address]-[email or web address]-tips-[email or web address]-injury OR  https://www.cdc.gov/steadi/patient.html

## 2024-01-10 NOTE — PROGRESS NOTE ADULT - REASON FOR ADMISSION
SAH   L3 compression fx

## 2024-01-10 NOTE — DISCHARGE NOTE PROVIDER - CARE PROVIDER_API CALL
Javid Murillo [facility]  Neurosurgery  [address] [place] [address]  Phone: [phone]  Fax: [phone]  Follow Up Time: 2 weeks    Ayaz Pinzon  Cardiovascular Disease  [address], [address]  [place] [address]  Phone: [phone]  Fax: [phone]  Follow Up Time:    Javid Murillo [facility]  Neurosurgery  [address] [place] [address]  Phone: [phone]  Fax: [phone]  Follow Up Time: 2 weeks    Ayaz Pinzon  Cardiovascular Disease  [address], [address]  [place] [address]  Phone: [phone]  Fax: [phone]  Follow Up Time:

## 2024-01-10 NOTE — DISCHARGE NOTE PROVIDER - NSDCMRMEDTOKEN_GEN_ALL_CORE_FT
acetaminophen 325 mg oral tablet: 3 tab(s) orally every 6 hours  azelastine 137 mcg/inh (0.1%) nasal spray: 2 spray(s) intranasally once a day  escitalopram 10 mg oral tablet: 1 tab(s) orally once a day (at bedtime)  lidocaine 4% topical film: Apply topically to affected area once a day 2 patches daily  lisinopril 40 mg oral tablet: 1 tab(s) orally once a day  methocarbamol 750 mg oral tablet: 1 tab(s) orally 3 times a day as needed for  muscle spasm  simvastatin 40 mg oral tablet: 1 tab(s) orally once a day

## 2024-01-10 NOTE — CONSULT NOTE ADULT - ASSESSMENT
A/P: Patient is an 84 y/o M with a PMHx of atrial fibrillation (on Eliquis), CAD s/p PCI (2018), HTN, HLD, prostate surgery, right TKR, and hernia surgery who [place] [facility] [place] s/p fall transferred to [facility] for further management. Patient states that for the last few months he has been experiencing dizzy spells and falls. Patient states that he has fallen at least 3 times in the last year. This past time the patient states that he tripped and struck his head on his dresser. Patient was also complaining of lower back pain as well, and went to see his Cardiologist as an outpatient. Patient's Cardiologist ordered a CT head that showed a left frontal SAH, and he was referred to [facility]. Patient was then transferred to [facility] for further management. SAH at this time is stable. Patient denies any fevers, chills, CP, SOB, syncope, abdominal pain, N/V/D, or vision changes.    A/P: Patient is an 84 y/o M with a PMHx of atrial fibrillation (on Eliquis), CAD s/p PCI (2018), HTN, HLD, prostate surgery, right TKR, and hernia surgery who [place] [facility] [place]H s/p fall transferred to [facility] for further management. Patient states that for the last few months he has been experiencing dizzy spells and falls. Patient states that he has fallen at least 3 times in the last year. This past time the patient states that he tripped and struck his head on his dresser. Patient was also complaining of lower back pain as well, and went to see his Cardiologist as an outpatient. Patient's Cardiologist ordered a CT head that showed a left frontal SAH, and he was referred to [facility]. Patient was then transferred to [facility] for further management. SAH at this time is stable. Patient denies any fevers, chills, CP, SOB, syncope, abdominal pain, N/V/D, or vision changes.

## 2024-01-10 NOTE — PROGRESS NOTE ADULT - ASSESSMENT
84 y/o M with pmhx of HTN, HLD, afib on Eliquis, shuffling gait , walks with walker , recurrent falls , CAD , s/p CABG( 2016)  , cardiac stents x3 , last 2008 , prostate cancer , s/p proctectomy  about 10 yrs ago ,  right knee replacement ( 2022) ,  hernia surgery, transferred from [facility] for SAH s/p fall. Pt wife at bedside, noting that he has had "dizzy spells" and falls for the last 2-3 months. States that last Thursday, pt hit his head on the dresser with + HS/-LOC after feeling dizzy from getting up. Pt denies HA, SOB, CP, or abd pain at the time, but states he hurt his left lower back during the fall, which continues to intermittently hurt him now. Notes they called the cardiologist whom they were able to see today. Cardiologist ordered an outpatient CTH which demonstrated L frontal SAH and was sent to [facility], eliquis was not reversed, findings confirmed, and then sent here. Of note, wife states patient shuffles his feet ever since his knee replacement.     1. Traumatic SDH, repeat CTH - stable     L3 fracture   patient is stable   NS noted and appreciated    Pain control PRN, avoid oversedation  - TLSO brace for comfort  - No acute neurosurgical interventions at this time  - Continue to hold AC/AP till cleared by NS   - Syncope work-up  - ECHO - nl EF 60-65%  - U/S of carotids - no stenosis  - check orthostatic virals once able to stand   - cardiology monitoring   - cardiology eval     2. Hx of Afib - rate controlled , hold Eliquis for now   patient with hx of recurrent falls - high risk to be on Eliquis ,   Cardiology noted and appreciated , plan to hold Eliquis x2 wks ,   patient with high risk to be on it , recommended to follow up with his own   cardiologist and see  EP  for possible ( LAAO)  Watchman  procedure     3. HTN - continue Lisinopril 40 mg daily with parameters     4. CAD/ stents x3 , last 2008 - hold ASA for now , may restart once cleared by NS, continue statin     5. Sniffling gait , uses walker , recurrent falls - PT- eval     Identification of Seniors at Risk:                                                                                                                                       Before the event that brought you to the hospital, did you need someone to help you on a regular basis?   NO  In the 24 hours before your injury, have you needed more help than usual?                                                NO           Have you been hospitalized for one or more nights during the past six months?                                         NO           In general, do you have serious problems with your vision that cannot be corrected with glasses?                NO - uses glasses    In general, do you have serious problems with your memory?                                                                    NO but becoming forgetful                 Do you take six or more different medications every day?                                                                          NO                    A positive screen is an answer  of yes to 2 or more - Total:  1  Screen  is negative .     Dispo plan is Home today .     Will sign off , please recall if needed .    82 y/o M with pmhx of HTN, HLD, afib on Eliquis, shuffling gait , walks with walker , recurrent falls , CAD , s/p CABG( 2016)  , cardiac stents x3 , last 2008 , prostate cancer , s/p proctectomy  about 10 yrs ago ,  right knee replacement ( 2022) ,  hernia surgery, transferred from [facility] for [place]H s/p fall. Pt wife at bedside, noting that he has had "dizzy spells" and falls for the last 2-3 months. States that last Thursday, pt hit his head on the dresser with + HS/-LOC after feeling dizzy from getting up. Pt denies HA, SOB, CP, or abd pain at the time, but states he hurt his left lower back during the fall, which continues to intermittently hurt him now. Notes they called the cardiologist whom they were able to see today. Cardiologist ordered an outpatient CTH which demonstrated L frontal SAH and was sent to [facility], eliquis was not reversed, findings confirmed, and then sent here. Of note, wife states patient shuffles his feet ever since his knee replacement.     1. Traumatic SDH, repeat CTH - stable     L3 fracture   patient is stable   NS noted and appreciated    Pain control PRN, avoid oversedation  - TLSO brace for comfort  - No acute neurosurgical interventions at this time  - Continue to hold AC/AP till cleared by NS   - Syncope work-up  - ECHO - nl EF 60-65%  - U/S of carotids - no stenosis  - check orthostatic virals once able to stand   - cardiology monitoring   - cardiology eval     2. Hx of Afib - rate controlled , hold Eliquis for now   patient with hx of recurrent falls - high risk to be on Eliquis ,   Cardiology noted and appreciated , plan to hold Eliquis x2 wks ,   patient with high risk to be on it , recommended to follow up with his own   cardiologist and see  EP  for possible ( LAAO)  Watchman  procedure     3. HTN - continue Lisinopril 40 mg daily with parameters     4. CAD/ stents x3 , last 2008 - hold ASA for now , may restart once cleared by NS, continue statin     5. Sniffling gait , uses walker , recurrent falls - PT- eval     Identification of Seniors at Risk:                                                                                                                                       Before the event that brought you to the hospital, did you need someone to help you on a regular basis?   NO  In the 24 hours before your injury, have you needed more help than usual?                                                NO           Have you been hospitalized for one or more nights during the past six months?                                         NO           In general, do you have serious problems with your vision that cannot be corrected with glasses?                NO - uses glasses    In general, do you have serious problems with your memory?                                                                    NO but becoming forgetful                 Do you take six or more different medications every day?                                                                          NO                    A positive screen is an answer  of yes to 2 or more - Total:  1  Screen  is negative .     Dispo plan is Home today .     Will sign off , please recall if needed .

## 2024-01-10 NOTE — DISCHARGE NOTE PROVIDER - NSDCCPCAREPLAN_GEN_ALL_CORE_FT
PRINCIPAL DISCHARGE DIAGNOSIS  Diagnosis: Subarachnoid hemorrhage  Assessment and Plan of Treatment: - FOLLOW UP: Please call and make an appointment with neurosurgeon Dr. Murillo 2 weeks after discharge. Also, please call and make an appointment with your primary care physician as per your usual schedule.   - ACTIVITY: May return to normal activities as tolerated. TLSO brace can be worn for comfort when out of bed.   - DIET: May continue regular diet.  - MEDICATIONS: Please resume home medications as previously prescribed except for Eliquis and Aspirin. You can take tylenol for pain relief, as needed. A muscle relaxant (robaxin) has also been prescribed for you. Do not drive, operate heavy machinery, or make important decisions while taking a muscle relaxant.  Patient is advised to RETURN TO THE EMERGENCY DEPARTMENT for any of the following - worsening pain, fever/chills, nausea/vomiting, altered mental status, chest pain, shortness of breath, or any other new / worsening symptom.      SECONDARY DISCHARGE DIAGNOSES  Diagnosis: Fracture of lumbar vertebra, compression  Assessment and Plan of Treatment: See above instructions for "subarachnoid hemorrhage"    Diagnosis: Atrial fibrillation  Assessment and Plan of Treatment: Do NOT continue Eliquis or Aspirin at this time. Please hold these medications until discussion with your cardiologist and neurosurgeon. Do not restart these medications until they say it is safe to do so. We encourage you to call and make an appointment to see your cardiologist soon after discharge to discuss this. Contact information for the cardiologist who saw you during your hospital stay has also been provided below.

## 2024-01-10 NOTE — CONSULT NOTE ADULT - NS ATTEND AMEND GEN_ALL_CORE FT
242 seen with above,    83M history significant for HTN, HLD, CAD with prior stents in 2018, chronic Afib (on Eliquis), R-knee replacement 2022 with recent worsening gait had recurrent falls but remains on Eliquis follows with outside cardiologist (Dr. Vergara at [facility]) had recent fall 4 days PTA, sent for head CT found with SDH and also L3 compression fracture, presented to [facility] and transfer to [facility] for management overall stable no neurosurgical intervention  -patient denies syncope, but has had recurrent falls, denies outside neurological workup  -telemetry with slow Aflutter 40s during sleep and up to 70s, no heart block  -denies prior CVA, per neurosurgical team need to hold AC for at least 2 weeks, but given recurrent falls with major bleeding event discussed he is not safe to continue long term AC use, recommend follow up with his cardiologist outpatient to discuss about elective left atrial occlusion procedure  -avoid AV rigoberto blocker given bradycardia   -resume ASA 81mg for CAD when able from neurosurgical standpoint  -check orthostatic BP and PT eval.   -reconsult if new cardiac issue arise        Ayaz Pinzon DO, [facility]  Faculty Non-Invasive Cardiologist  [phone] seen with above,    83M history significant for HTN, HLD, CAD with prior stents in 2018, chronic Afib (on Eliquis), R-knee replacement 2022 with recent worsening gait had recurrent falls but remains on Eliquis follows with outside cardiologist (Dr. Vergara at [facility]) had recent fall 4 days PTA, sent for head CT found with SDH and also L3 compression fracture, presented to [facility] and transfer to [facility] for management overall stable no neurosurgical intervention  -patient denies syncope, but has had recurrent falls, denies outside neurological workup  -telemetry with slow Aflutter 40s during sleep and up to 70s, no heart block  -denies prior CVA, per neurosurgical team need to hold AC for at least 2 weeks, but given recurrent falls with major bleeding event discussed he is not safe to continue long term AC use, recommend follow up with his cardiologist outpatient to discuss about elective left atrial occlusion procedure  -avoid AV rigoberto blocker given bradycardia   -resume ASA 81mg for CAD when able from neurosurgical standpoint  -check orthostatic BP and PT eval.   -reconsult if new cardiac issue arise        Ayaz Pinzon DO, [facility]  Faculty Non-Invasive Cardiologist  [phone]

## 2024-01-10 NOTE — DISCHARGE NOTE PROVIDER - HOSPITAL COURSE
Pt is an 84 y/o male s/p fall on Eliqius sustaining a subarachnoid hemorrhage and L3 compression fx. Patient was admitted to the trauma service. Eliquis held. Neurosx consulted - repeat CT heads stable. In regards to L3 compression fx neurosx / spine recommended pt wear TLSO brace for comfort. Pt remained neurologically intact. Hospitalist & cardiology consulted for syncope work-up. TTE showed EF 60-65%, carotid duplex with no hemodynamically significant stenosis. Cardiology and neurosx recommend Eliquis & ASA be held in the setting of intracranial hemorrhage. Pt is aware he should not continue those medications for now and that he should consult with cards & neurosx prior to restarting. Pt was evaluated by PT who recommended home w/ home PT upon discharge. Pt is tolerating a diet, OOB ambulating, voiding, and pain well controlled. Stable for d/c home. Pt is an 82 y/o male s/p fall on Eliqius sustaining a subarachnoid hemorrhage and L3 compression fx. Patient was admitted to the trauma service. Eliquis held. Neurosx consulted - repeat CT heads stable. In regards to L3 compression fx neurosx / spine recommended pt wear TLSO brace for comfort. Pt remained neurologically intact. Hospitalist & cardiology consulted for syncope work-up. TTE showed EF 60-65%, carotid duplex with no hemodynamically significant stenosis. Cardiology and neurosx recommend Eliquis & ASA be held in the setting of intracranial hemorrhage. Pt is aware he should not continue those medications for now and that he should consult with cards & neurosx prior to restarting. Pt was evaluated by PT who recommended home w/ home PT upon discharge. Pt is tolerating a diet, OOB ambulating, voiding, and pain well controlled. Stable for d/c home.

## 2024-01-10 NOTE — DISCHARGE NOTE PROVIDER - NSDCHHNEEDSERVICE_GEN_ALL_CORE
[FreeTextEntry1] : I, Janet Escudero, acted solely as a scribe for Dr. Elias on this date 06/18/2021.  Rehabilitation services

## 2024-01-10 NOTE — DISCHARGE NOTE PROVIDER - PROVIDER TOKENS
PROVIDER:[TOKEN:[[id number]],FOLLOWUP:[2 weeks]],PROVIDER:[TOKEN:[[id number]]] PROVIDER:[TOKEN:[[id number]],FOLLOWUP:[2 weeks]],PROVIDER:[TOKEN:[[id number]]]

## 2024-01-10 NOTE — DISCHARGE NOTE NURSING/CASE MANAGEMENT/SOCIAL WORK - PATIENT PORTAL LINK FT
You can access the FollowMyHealth Patient Portal offered by [facility] by registering at the following website: [email or web address]. By joining [employer]’s FollowMyHealth portal, you will also be able to view your health information using other applications (apps) compatible with our system. You can access the FollowMyHealth Patient Portal offered by [facility] by registering at the following website: [email or web address]. By joining [employer]’s FollowMyHealth portal, you will also be able to view your health information using other applications (apps) compatible with our system.

## 2024-01-10 NOTE — CONSULT NOTE ADULT - PROBLEM SELECTOR RECOMMENDATION 9
- Patient with history of atrial fibrillation on Eliquis for approximately 8 years.   - Patient currently with atrial flutter with slow ventricular response.   - Continue to hold AV nodals at this time.   - Eliquis on hold for SAH.   - Restart when cleared from a Neurosurgery perspective.   - Patient should followup with EP as an outpatient to discuss possible LAAO device as he is at risk for further falls and bleeding events.   - Echocardiogram with normal LVEF, mild LAE.   - Continue telemetry monitoring.  - Discussed risks of CVA with the patient, but at this time risk of worsening bleeding in setting of SAH outweighs risk of CVA.

## 2024-01-12 RX ORDER — LISINOPRIL 2.5 MG/1
1 TABLET ORAL
Refills: 0 | DISCHARGE

## 2024-01-12 RX ORDER — ESCITALOPRAM OXALATE 10 MG/1
1 TABLET, FILM COATED ORAL
Refills: 0 | DISCHARGE

## 2024-01-12 RX ORDER — AZELASTINE 137 UG/1
2 SPRAY, METERED NASAL
Refills: 0 | DISCHARGE

## 2024-01-12 RX ORDER — SIMVASTATIN 20 MG/1
1 TABLET, FILM COATED ORAL
Refills: 0 | DISCHARGE

## 2024-01-31 ENCOUNTER — APPOINTMENT (OUTPATIENT)
Dept: CT IMAGING | Facility: CLINIC | Age: 84
End: 2024-01-31
Payer: MEDICARE

## 2024-01-31 PROCEDURE — 70450 CT HEAD/BRAIN W/O DYE: CPT | Mod: MH

## 2024-03-13 ENCOUNTER — APPOINTMENT (OUTPATIENT)
Dept: CT IMAGING | Facility: CLINIC | Age: 84
End: 2024-03-13
Payer: MEDICARE

## 2024-03-13 PROCEDURE — 71275 CT ANGIOGRAPHY CHEST: CPT | Mod: MH

## 2024-05-20 ENCOUNTER — APPOINTMENT (OUTPATIENT)
Dept: CT IMAGING | Facility: CLINIC | Age: 84
End: 2024-05-20
Payer: MEDICARE

## 2024-05-20 PROCEDURE — 72131 CT LUMBAR SPINE W/O DYE: CPT | Mod: MH

## 2024-09-19 ENCOUNTER — APPOINTMENT (OUTPATIENT)
Dept: NEUROSURGERY | Facility: CLINIC | Age: 84
End: 2024-09-19

## 2025-03-06 ENCOUNTER — RESULT REVIEW (OUTPATIENT)
Age: 85
End: 2025-03-06

## 2025-04-09 NOTE — ED ADULT NURSE NOTE - CAS EDN DISCHARGE INTERVENTIONS
Pt here ambulatory with his daughter for a 6 to 7 hr hydration  Pt without c/o's Pt reports that his lab numbers have been off for awhile and they are hoping to get some of his lab numbers back in line   Pt is aware she is here for hydration for several hours and denies any questions or concerns IV # 22 was started in his RAC per LUCRETIA Sosa RN  Blood for lab work was drawn without difficulty  Pt consuelo well  Warmed 1000 cc NS starting to infuse  Family remains at side Will monitor        IV intact/Admission wristband placed